# Patient Record
Sex: MALE | Race: OTHER | Employment: FULL TIME | URBAN - METROPOLITAN AREA
[De-identification: names, ages, dates, MRNs, and addresses within clinical notes are randomized per-mention and may not be internally consistent; named-entity substitution may affect disease eponyms.]

---

## 2017-01-29 ENCOUNTER — APPOINTMENT (OUTPATIENT)
Dept: GENERAL RADIOLOGY | Age: 62
End: 2017-01-29
Attending: EMERGENCY MEDICINE
Payer: COMMERCIAL

## 2017-01-29 ENCOUNTER — HOSPITAL ENCOUNTER (OUTPATIENT)
Age: 62
Discharge: HOME | End: 2017-01-29
Attending: EMERGENCY MEDICINE
Payer: COMMERCIAL

## 2017-01-29 VITALS
HEIGHT: 68 IN | WEIGHT: 178 LBS | SYSTOLIC BLOOD PRESSURE: 122 MMHG | DIASTOLIC BLOOD PRESSURE: 78 MMHG | RESPIRATION RATE: 16 BRPM | HEART RATE: 68 BPM | TEMPERATURE: 98 F | OXYGEN SATURATION: 98 % | BODY MASS INDEX: 26.98 KG/M2

## 2017-01-29 DIAGNOSIS — S61.411A: Primary | ICD-10-CM

## 2017-01-29 PROCEDURE — 99213 OFFICE O/P EST LOW 20 MIN: CPT

## 2017-01-29 PROCEDURE — 99203 OFFICE O/P NEW LOW 30 MIN: CPT

## 2017-01-29 PROCEDURE — 73130 X-RAY EXAM OF HAND: CPT

## 2017-01-29 RX ORDER — CEPHALEXIN 500 MG/1
500 CAPSULE ORAL 3 TIMES DAILY
Qty: 21 CAPSULE | Refills: 0 | Status: SHIPPED | OUTPATIENT
Start: 2017-01-29 | End: 2017-02-05

## 2017-01-29 NOTE — ED PROVIDER NOTES
Patient Seen in: [facility] [address] [place] [address]    History   Patient presents with:  Laceration Abrasion (integumentary)    Stated Complaint: laceration r hand    HPI    Patient is a 28-year-old male with a history of hypertension and coron Oral Tab EC,  Take  by mouth.        Family History   Problem Relation Age of Onset   • Diabetes Father    • Diabetes Mother    • Heart Disease Mother    • Lipids Other      family h/o hyperlipidemia and vascular disease   • Glaucoma Neg    • Macular degene treatment, right, initial encounter  (primary encounter diagnosis)    Disposition:  Discharge    Follow-up:  Valerie Bowman MD  [address]  [facility] [phone]    Schedule an appointment as soon as possible for a visit in 2 days  For R

## 2017-02-10 ENCOUNTER — OFFICE VISIT (OUTPATIENT)
Dept: INTERNAL MEDICINE CLINIC | Facility: CLINIC | Age: 62
End: 2017-02-10

## 2017-02-10 ENCOUNTER — TELEPHONE (OUTPATIENT)
Dept: INTERNAL MEDICINE CLINIC | Facility: CLINIC | Age: 62
End: 2017-02-10

## 2017-02-10 VITALS
TEMPERATURE: 98 F | OXYGEN SATURATION: 98 % | SYSTOLIC BLOOD PRESSURE: 130 MMHG | BODY MASS INDEX: 25.5 KG/M2 | HEART RATE: 76 BPM | DIASTOLIC BLOOD PRESSURE: 78 MMHG | HEIGHT: 69 IN | WEIGHT: 172.19 LBS

## 2017-02-10 DIAGNOSIS — Q38.3 TONGUE ABNORMALITY: ICD-10-CM

## 2017-02-10 DIAGNOSIS — I10 ESSENTIAL HYPERTENSION WITH GOAL BLOOD PRESSURE LESS THAN 140/90: ICD-10-CM

## 2017-02-10 DIAGNOSIS — I25.83 CORONARY ARTERY DISEASE DUE TO LIPID RICH PLAQUE: ICD-10-CM

## 2017-02-10 DIAGNOSIS — Z23 IMMUNIZATION DUE: Primary | ICD-10-CM

## 2017-02-10 DIAGNOSIS — E11.9 TYPE 2 DIABETES MELLITUS WITHOUT COMPLICATION, WITHOUT LONG-TERM CURRENT USE OF INSULIN (HCC): ICD-10-CM

## 2017-02-10 DIAGNOSIS — I25.10 CORONARY ARTERY DISEASE DUE TO LIPID RICH PLAQUE: ICD-10-CM

## 2017-02-10 PROCEDURE — 90471 IMMUNIZATION ADMIN: CPT | Performed by: INTERNAL MEDICINE

## 2017-02-10 PROCEDURE — 99212 OFFICE O/P EST SF 10 MIN: CPT | Performed by: INTERNAL MEDICINE

## 2017-02-10 PROCEDURE — 90670 PCV13 VACCINE IM: CPT | Performed by: INTERNAL MEDICINE

## 2017-02-10 PROCEDURE — 99214 OFFICE O/P EST MOD 30 MIN: CPT | Performed by: INTERNAL MEDICINE

## 2017-02-10 RX ORDER — CLOTRIMAZOLE AND BETAMETHASONE DIPROPIONATE 10; .64 MG/G; MG/G
CREAM TOPICAL
Refills: 1 | COMMUNITY
Start: 2017-01-07

## 2017-02-10 RX ORDER — METFORMIN HYDROCHLORIDE 500 MG/1
TABLET, EXTENDED RELEASE ORAL
Qty: 270 TABLET | Refills: 0 | Status: SHIPPED | OUTPATIENT
Start: 2017-02-10 | End: 2017-04-04

## 2017-02-10 NOTE — PROGRESS NOTES
HPI:    Patient ID: Angela Butcher is a 62year old male. HPI  Establish care.    Angela Butcher is a 62year old male who presents for a complete physical exam.   HPI:   Patient presents with:  Establish Care     Hypertension  Patient is here CA 8.8 11/07/2016 09:52 AM   AST 21 11/07/2016 09:52 AM   ALT 20 11/07/2016 09:52 AM   TSH 2.36 09/22/2014 08:44 AM          Lab Results  Component Value Date/Time   CHOLEST 145 11/07/2016 09:52 AM   HDL 36 11/07/2016 09:52 AM   TRIG 144 11/07/2016 09:52 A MetFORMIN HCl  MG Oral Tablet 24 Hr TAKE TWO TABLETS BY MOUTH AT BEDTIME and 1 tab in AM. Disp: 270 tablet Rfl: 0   nystatin [id number] UNIT/ML Mouth/Throat Suspension Take 5 mL (500,000 Units total) by mouth 4 (four) times daily.  Disp: 140 mL Rfl: 0   l • Diabetes Sister    • Diabetes Brother       Social History:  Social History    Marital Status:              Spouse Name:                       Years of Education:                 Number of children:               Occupational History  Occupation Neurological: Negative. Negative for dizziness, tremors, seizures, syncope, facial asymmetry, speech difficulty, weakness, light-headedness, numbness and headaches. Psychiatric/Behavioral: Negative.   Negative for suicidal ideas, hallucinations, behavior • Age-related nuclear cataract of both eyes 4/23/2015   • Type II or unspecified type diabetes mellitus without mention of complication, not stated as uncontrolled Dx 2014     Pills only   • Fungal infection      Toingue. bx.            Past Surgical Histor Nose: Nose normal. No mucosal edema, rhinorrhea, nose lacerations or sinus tenderness. Right sinus exhibits no maxillary sinus tenderness and no frontal sinus tenderness. Left sinus exhibits no maxillary sinus tenderness and no frontal sinus tenderness. Pulmonary/Chest: Effort normal and breath sounds normal. No accessory muscle usage or stridor. No apnea, no tachypnea and no bradypnea. No respiratory distress. He has no decreased breath sounds. He has no wheezes. He has no rhonchi. He has no rales.  He ex Type 2 diabetes mellitus without complication, without long-term current use of insulin (hcc) Better no lows. Careful with diet and excercise at least 30 minutes 3-4 times a week. Check sugars at different times on different dates. Careful with low sugars.

## 2017-02-10 NOTE — TELEPHONE ENCOUNTER
Patient will call office back with eye doctor information signed medical release  [facility]

## 2017-02-10 NOTE — PATIENT INSTRUCTIONS
ASSESSMENT/PLAN:   Immunization due  (primary encounter diagnosis)    Type 2 diabetes mellitus without complication, without long-term current use of insulin (hcc) Better no lows. Careful with diet and excercise at least 30 minutes 3-4 times a week.  Check

## 2017-02-10 NOTE — TELEPHONE ENCOUNTER
Medical record release faxed with confirmation:    Dr Rodriguez [facility]    [phone]  Fax [phone]

## 2017-04-04 ENCOUNTER — TELEPHONE (OUTPATIENT)
Dept: INTERNAL MEDICINE CLINIC | Facility: CLINIC | Age: 62
End: 2017-04-04

## 2017-04-04 RX ORDER — METFORMIN HYDROCHLORIDE 500 MG/1
TABLET, EXTENDED RELEASE ORAL
Qty: 90 TABLET | Refills: 0 | Status: SHIPPED | OUTPATIENT
Start: 2017-04-04 | End: 2022-01-17

## 2017-04-04 NOTE — TELEPHONE ENCOUNTER
Current outpatient prescriptions:   •  •  MetFORMIN HCl  MG Oral Tablet 24 Hr, TAKE TWO TABLETS BY MOUTH AT BEDTIME and 1 tab in AM., Disp: 270 tablet, Rfl: 0  •

## 2017-04-15 ENCOUNTER — TELEPHONE (OUTPATIENT)
Dept: INTERNAL MEDICINE CLINIC | Facility: CLINIC | Age: 62
End: 2017-04-15

## 2017-04-15 NOTE — TELEPHONE ENCOUNTER
Patients daughter is calling with c/o patient having a fever that started two days ago. Per daughter patient has a fever of 100.0, has nausea, and is weak.   Per daughter patient is lucid, is replacing fluids, has no abd pains, no body aches, no headache,

## 2017-05-15 ENCOUNTER — TELEPHONE (OUTPATIENT)
Dept: INTERNAL MEDICINE CLINIC | Facility: CLINIC | Age: 62
End: 2017-05-15

## 2017-05-15 NOTE — TELEPHONE ENCOUNTER
[facility]  Ext. [phone] Patient is due for diabetic follow up appointment. Please assist in scheduling appointment when patient returns call.

## 2017-05-23 ENCOUNTER — TELEPHONE (OUTPATIENT)
Dept: INTERNAL MEDICINE CLINIC | Facility: CLINIC | Age: 62
End: 2017-05-23

## 2017-05-23 DIAGNOSIS — E11.9 DIABETES MELLITUS TYPE 2 WITHOUT RETINOPATHY (HCC): Primary | ICD-10-CM

## 2017-07-24 ENCOUNTER — CHARTING TRANS (OUTPATIENT)
Dept: OTHER | Age: 62
End: 2017-07-24

## 2017-07-24 ASSESSMENT — PAIN SCALES - GENERAL: PAINLEVEL_OUTOF10: 0

## 2017-07-27 ENCOUNTER — LAB SERVICES (OUTPATIENT)
Dept: OTHER | Age: 62
End: 2017-07-27

## 2017-07-28 ENCOUNTER — PRIOR ORIGINAL RECORDS (OUTPATIENT)
Dept: OTHER | Age: 62
End: 2017-07-28

## 2017-07-28 LAB
ALBUMIN SERPL-MCNC: 4.3 G/DL (ref 3.6–5.1)
ALBUMIN/GLOB SERPL: 1.5 (ref 1–2.4)
ALP SERPL-CCNC: 51 UNITS/L (ref 45–117)
ALT SERPL-CCNC: 32 UNITS/L
ANION GAP SERPL CALC-SCNC: 12 MMOL/L (ref 10–20)
AST SERPL-CCNC: 25 UNITS/L
BASOPHILS # BLD: 0 K/MCL (ref 0–0.3)
BASOPHILS NFR BLD: 0 %
BILIRUB SERPL-MCNC: 1 MG/DL (ref 0.2–1)
BUN SERPL-MCNC: 10 MG/DL (ref 6–20)
BUN/CREAT SERPL: 14 (ref 7–25)
CALCIUM SERPL-MCNC: 8.4 MG/DL (ref 8.4–10.2)
CHLORIDE SERPL-SCNC: 103 MMOL/L (ref 98–107)
CHOLEST SERPL-MCNC: 135 MG/DL
CHOLEST/HDLC SERPL: 3.5
CO2 SERPL-SCNC: 30 MMOL/L (ref 21–32)
CREAT SERPL-MCNC: 0.73 MG/DL (ref 0.67–1.17)
DIFFERENTIAL METHOD BLD: NORMAL
EOSINOPHIL # BLD: 0.1 K/MCL (ref 0.1–0.5)
EOSINOPHIL NFR BLD: 2 %
ERYTHROCYTE [DISTWIDTH] IN BLOOD: 12.4 % (ref 11–15)
GLOBULIN SER-MCNC: 2.9 G/DL (ref 2–4)
GLUCOSE SERPL-MCNC: 110 MG/DL (ref 65–99)
HBA1C MFR BLD: 6.5 % (ref 4.5–5.6)
HDLC SERPL-MCNC: 39 MG/DL
HEMATOCRIT: 41.8 % (ref 39–51)
HEMOGLOBIN: 13.7 G/DL (ref 13–17)
LDLC SERPL CALC-MCNC: 62 MG/DL
LENGTH OF FAST TIME PATIENT: 12 HRS
LENGTH OF FAST TIME PATIENT: 12 HRS
LYMPHOCYTES # BLD: 2 K/MCL (ref 1–4)
LYMPHOCYTES NFR BLD: 36 %
MEAN CORPUSCULAR HEMOGLOBIN: 29.8 PG (ref 26–34)
MEAN CORPUSCULAR HGB CONC: 32.8 G/DL (ref 32–36.5)
MEAN CORPUSCULAR VOLUME: 91.1 FL (ref 78–100)
MONOCYTES # BLD: 0.5 K/MCL (ref 0.3–0.9)
MONOCYTES NFR BLD: 9 %
NEUTROPHILS # BLD: 2.9 K/MCL (ref 1.8–7.7)
NEUTROPHILS NFR BLD: 53 %
NONHDLC SERPL-MCNC: 96 MG/DL
PLATELET COUNT: 221 K/MCL (ref 140–450)
POTASSIUM SERPL-SCNC: 4.2 MMOL/L (ref 3.4–5.1)
RED CELL COUNT: 4.59 MIL/MCL (ref 4.5–5.9)
SODIUM SERPL-SCNC: 141 MMOL/L (ref 135–145)
TOTAL PROTEIN: 7.2 G/DL (ref 6.4–8.2)
TRIGL SERPL-MCNC: 170 MG/DL
WHITE BLOOD COUNT: 5.6 K/MCL (ref 4.2–11)

## 2017-09-06 ENCOUNTER — PRIOR ORIGINAL RECORDS (OUTPATIENT)
Dept: OTHER | Age: 62
End: 2017-09-06

## 2017-09-06 LAB
ALBUMIN: 4.3 G/DL
BILIRUBIN TOTAL: 1 MG/DL
BUN: 10 MG/DL
CALCIUM: 8.4 MG/DL
CHLORIDE: 103 MEQ/L
CHOLESTEROL, TOTAL: 135 MG/DL
CREATININE, SERUM: 0.73 MG/DL
GLOBULIN: 2.9 G/DL
GLUCOSE: 110 MG/DL
HDL CHOLESTEROL: 39 MG/DL
HEMATOCRIT: 41.8 %
HEMOGLOBIN A1C: 6.5 %
HEMOGLOBIN: 13.7 G/DL
LDL CHOLESTEROL: 62 MG/DL
PLATELETS: 221 K/UL
POTASSIUM, SERUM: 4.2 MEQ/L
PROTEIN, TOTAL: 7.2 G/DL
RED BLOOD COUNT: 4.59 X 10-6/U
SGOT (AST): 25 IU/L
SGPT (ALT): 32 IU/L
SODIUM: 141 MEQ/L
TRIGLYCERIDES: 170 MG/DL
WHITE BLOOD COUNT: 5.6 X 10-3/U

## 2017-09-11 LAB
ALBUMIN: 4.3 G/DL
ALKALINE PHOSPHATATE(ALK PHOS): 51 IU/L
ALT (SGPT): 32 U/L
AST (SGOT): 25 U/L
BILIRUBIN TOTAL: 1 MG/DL
BUN: 10 MG/DL
CALCIUM: 8.4 MG/DL
CHLORIDE: 103 MEQ/L
CHOLESTEROL, TOTAL: 135 MG/DL
CREATININE, SERUM: 0.73 MG/DL
GLOBULIN: 2.9 G/DL
GLUCOSE: 110 MG/DL
GLUCOSE: 110 MG/DL
HDL CHOLESTEROL: 39 MG/DL
HEMOGLOBIN A1C: 6.5 %
LDL CHOLESTEROL: 62 MG/DL
POTASSIUM, SERUM: 4.2 MEQ/L
PROTEIN, TOTAL: 7.2 G/DL
SGOT (AST): 25 IU/L
SGPT (ALT): 32 IU/L
SODIUM: 141 MEQ/L
TRIGLYCERIDES: 170 MG/DL

## 2017-09-13 ENCOUNTER — PRIOR ORIGINAL RECORDS (OUTPATIENT)
Dept: OTHER | Age: 62
End: 2017-09-13

## 2017-10-10 ENCOUNTER — CHARTING TRANS (OUTPATIENT)
Dept: OTHER | Age: 62
End: 2017-10-10

## 2017-10-10 ASSESSMENT — PAIN SCALES - GENERAL: PAINLEVEL_OUTOF10: 3

## 2017-11-13 ENCOUNTER — LAB SERVICES (OUTPATIENT)
Dept: OTHER | Age: 62
End: 2017-11-13

## 2017-11-13 ENCOUNTER — CHARTING TRANS (OUTPATIENT)
Dept: OTHER | Age: 62
End: 2017-11-13

## 2017-11-13 LAB
CREATININE RANDOM URINE: 46 MG/DL
MICROALBUMIN UR-MCNC: 0.62 MG/DL
MICROALBUMIN/CREAT UR: 13.5 MCG/MG

## 2017-11-14 ENCOUNTER — CHARTING TRANS (OUTPATIENT)
Dept: OTHER | Age: 62
End: 2017-11-14

## 2017-11-14 LAB
ALBUMIN SERPL-MCNC: 4 G/DL (ref 3.6–5.1)
ALBUMIN/GLOB SERPL: 1.1 (ref 1–2.4)
ALP SERPL-CCNC: 58 UNITS/L (ref 45–117)
ALT SERPL-CCNC: 32 UNITS/L
ANION GAP SERPL CALC-SCNC: 10 MMOL/L (ref 10–20)
AST SERPL-CCNC: 17 UNITS/L
BILIRUB SERPL-MCNC: 0.8 MG/DL (ref 0.2–1)
BUN SERPL-MCNC: 8 MG/DL (ref 6–20)
BUN/CREAT SERPL: 11 (ref 7–25)
CALCIUM SERPL-MCNC: 8.4 MG/DL (ref 8.4–10.2)
CHLORIDE SERPL-SCNC: 104 MMOL/L (ref 98–107)
CHOLEST SERPL-MCNC: 158 MG/DL
CHOLEST/HDLC SERPL: 3.8
CO2 SERPL-SCNC: 29 MMOL/L (ref 21–32)
CREAT SERPL-MCNC: 0.76 MG/DL (ref 0.67–1.17)
GLOBULIN SER-MCNC: 3.5 G/DL (ref 2–4)
GLUCOSE SERPL-MCNC: 117 MG/DL (ref 65–99)
HBA1C MFR BLD: 6.7 % (ref 4.5–5.6)
HDLC SERPL-MCNC: 42 MG/DL
LDLC SERPL CALC-MCNC: 63 MG/DL
LENGTH OF FAST TIME PATIENT: 12 HRS
LENGTH OF FAST TIME PATIENT: 12 HRS
NONHDLC SERPL-MCNC: 116 MG/DL
POTASSIUM SERPL-SCNC: 4.2 MMOL/L (ref 3.4–5.1)
SODIUM SERPL-SCNC: 139 MMOL/L (ref 135–145)
TOTAL PROTEIN: 7.5 G/DL (ref 6.4–8.2)
TRIGL SERPL-MCNC: 266 MG/DL

## 2018-05-12 ENCOUNTER — CHARTING TRANS (OUTPATIENT)
Dept: OTHER | Age: 63
End: 2018-05-12

## 2018-09-11 ENCOUNTER — PRIOR ORIGINAL RECORDS (OUTPATIENT)
Dept: OTHER | Age: 63
End: 2018-09-11

## 2018-09-12 ENCOUNTER — PRIOR ORIGINAL RECORDS (OUTPATIENT)
Dept: OTHER | Age: 63
End: 2018-09-12

## 2018-09-12 ENCOUNTER — MYAURORA ACCOUNT LINK (OUTPATIENT)
Dept: OTHER | Age: 63
End: 2018-09-12

## 2018-09-12 LAB
ALBUMIN: 4.4 G/DL
ALKALINE PHOSPHATATE(ALK PHOS): 52 IU/L
BILIRUBIN TOTAL: 1.1 MG/DL
BUN: 8 MG/DL
CALCIUM: 9 MG/DL
CHLORIDE: 100 MEQ/L
CHOLESTEROL, TOTAL: 167 MG/DL
CREATININE, SERUM: 0.78 MG/DL
GLOBULIN: 2.6 G/DL
GLUCOSE: 126 MG/DL
HDL CHOLESTEROL: 43 MG/DL
LDL CHOLESTEROL: 91 MG/DL
POTASSIUM, SERUM: 4.1 MEQ/L
PROTEIN, TOTAL: 7 G/DL
SGOT (AST): 16 IU/L
SGPT (ALT): 14 IU/L
SODIUM: 137 MEQ/L
TRIGLYCERIDES: 238 MG/DL

## 2018-09-13 ENCOUNTER — PRIOR ORIGINAL RECORDS (OUTPATIENT)
Dept: OTHER | Age: 63
End: 2018-09-13

## 2018-09-14 ENCOUNTER — PRIOR ORIGINAL RECORDS (OUTPATIENT)
Dept: OTHER | Age: 63
End: 2018-09-14

## 2018-10-09 ENCOUNTER — CHARTING TRANS (OUTPATIENT)
Dept: OTHER | Age: 63
End: 2018-10-09

## 2018-10-09 ASSESSMENT — PAIN SCALES - GENERAL: PAINLEVEL_OUTOF10: 0

## 2018-11-01 ENCOUNTER — CHARTING TRANS (OUTPATIENT)
Dept: OTHER | Age: 63
End: 2018-11-01

## 2018-11-02 VITALS
WEIGHT: 171.96 LBS | RESPIRATION RATE: 16 BRPM | HEART RATE: 75 BPM | OXYGEN SATURATION: 98 % | BODY MASS INDEX: 25.47 KG/M2 | TEMPERATURE: 98.1 F | HEIGHT: 69 IN

## 2018-11-03 VITALS
HEART RATE: 71 BPM | WEIGHT: 172.18 LBS | TEMPERATURE: 98.5 F | HEIGHT: 69 IN | OXYGEN SATURATION: 99 % | BODY MASS INDEX: 25.5 KG/M2 | RESPIRATION RATE: 17 BRPM

## 2018-11-27 VITALS
WEIGHT: 165.67 LBS | OXYGEN SATURATION: 99 % | RESPIRATION RATE: 16 BRPM | HEART RATE: 68 BPM | HEIGHT: 69 IN | BODY MASS INDEX: 24.54 KG/M2

## 2018-12-03 ENCOUNTER — PRIOR ORIGINAL RECORDS (OUTPATIENT)
Dept: OTHER | Age: 63
End: 2018-12-03

## 2018-12-04 LAB
CHOLESTEROL, TOTAL: 142 MG/DL
HDL CHOLESTEROL: 47 MG/DL
LDL CHOLESTEROL: 71 MG/DL
TRIGLYCERIDES: 164 MG/DL

## 2018-12-10 ENCOUNTER — PRIOR ORIGINAL RECORDS (OUTPATIENT)
Dept: OTHER | Age: 63
End: 2018-12-10

## 2018-12-11 ENCOUNTER — PRIOR ORIGINAL RECORDS (OUTPATIENT)
Dept: OTHER | Age: 63
End: 2018-12-11

## 2019-01-24 ENCOUNTER — HOSPITAL (OUTPATIENT)
Dept: OTHER | Age: 64
End: 2019-01-24
Attending: FAMILY MEDICINE

## 2019-01-24 ENCOUNTER — DIAGNOSTIC TRANS (OUTPATIENT)
Dept: OTHER | Age: 64
End: 2019-01-24

## 2019-01-24 LAB
ANALYZER ANC (IANC): ABNORMAL
ANALYZER ANC (IANC): ABNORMAL
ANION GAP SERPL CALC-SCNC: 19 MMOL/L (ref 10–20)
ANION GAP SERPL CALC-SCNC: 19 MMOL/L (ref 10–20)
BASO+EOS+MONOS # BLD: 0.4 K/MCL (ref 0.1–1.1)
BASO+EOS+MONOS # BLD: 0.4 THOUSAND/MCL (ref 0.1–1.1)
BASO+EOS+MONOS NFR BLD: 5 %
BASO+EOS+MONOS NFR BLD: 5 %
BUN SERPL-MCNC: 9 MG/DL (ref 6–20)
BUN SERPL-MCNC: 9 MG/DL (ref 6–20)
BUN/CREAT SERPL: 13 (ref 7–25)
BUN/CREAT SERPL: 13 (ref 7–25)
CHLORIDE SERPL-SCNC: 98 MMOL/L (ref 98–107)
CHLORIDE: 98 MMOL/L (ref 98–107)
CO2 SERPL-SCNC: 25 MMOL/L (ref 21–32)
CO2 SERPL-SCNC: 25 MMOL/L (ref 21–32)
CREAT SERPL-MCNC: 0.7 MG/DL (ref 0.67–1.17)
CREAT SERPL-MCNC: 0.7 MG/DL (ref 0.67–1.17)
DIFFERENTIAL METHOD BLD: ABNORMAL
DIFFERENTIAL METHOD BLD: ABNORMAL
ERYTHROCYTE [DISTWIDTH] IN BLOOD: 12.9 % (ref 11–15)
ERYTHROCYTE [DISTWIDTH] IN BLOOD: 12.9 % (ref 11–15)
GLUCOSE SERPL-MCNC: 237 MG/DL (ref 65–99)
GLUCOSE SERPL-MCNC: 237 MG/DL (ref 65–99)
HCT VFR BLD CALC: 41.8 % (ref 39–51)
HEMATOCRIT: 41.8 % (ref 39–51)
HGB BLD-MCNC: 14 G/DL (ref 13–17)
HGB BLD-MCNC: 14 GM/DL (ref 13–17)
LYMPHOCYTES # BLD: 1.3 K/MCL (ref 1–4)
LYMPHOCYTES # BLD: 1.3 THOUSAND/MCL (ref 1–4)
LYMPHOCYTES NFR BLD: 13 %
LYMPHOCYTES NFR BLD: 13 %
MCH RBC QN AUTO: 30.1 PG (ref 26–34)
MCH RBC QN AUTO: 30.1 PG (ref 26–34)
MCHC RBC AUTO-ENTMCNC: 33.5 G/DL (ref 32–36.5)
MCHC RBC AUTO-ENTMCNC: 33.5 GM/DL (ref 32–36.5)
MCV RBC AUTO: 89.9 FL (ref 78–100)
MCV RBC AUTO: 89.9 FL (ref 78–100)
NEUTROPHILS # BLD: 7.9 K/MCL (ref 1.8–7.7)
NEUTROPHILS # BLD: 7.9 THOUSAND/MCL (ref 1.8–7.7)
NEUTROPHILS NFR BLD: 82 %
NEUTROPHILS NFR BLD: 82 %
NEUTS SEG NFR BLD: ABNORMAL %
NEUTS SEG NFR BLD: ABNORMAL %
NRBC (NRBCRE): ABNORMAL
NRBC (NRBCRE): ABNORMAL
PLATELET # BLD: 229 K/MCL (ref 140–450)
PLATELET # BLD: 229 THOUSAND/MCL (ref 140–450)
POTASSIUM SERPL-SCNC: 3.8 MMOL/L (ref 3.4–5.1)
POTASSIUM SERPL-SCNC: 3.8 MMOL/L (ref 3.4–5.1)
RBC # BLD: 4.65 MIL/MCL (ref 4.5–5.9)
RBC # BLD: 4.65 MILLION/MCL (ref 4.5–5.9)
SODIUM SERPL-SCNC: 138 MMOL/L (ref 135–145)
SODIUM SERPL-SCNC: 138 MMOL/L (ref 135–145)
WBC # BLD: 9.6 K/MCL (ref 4.2–11)
WBC # BLD: 9.6 THOUSAND/MCL (ref 4.2–11)

## 2019-02-28 VITALS
HEIGHT: 68 IN | SYSTOLIC BLOOD PRESSURE: 126 MMHG | HEART RATE: 74 BPM | RESPIRATION RATE: 14 BRPM | DIASTOLIC BLOOD PRESSURE: 70 MMHG | WEIGHT: 168 LBS | BODY MASS INDEX: 25.46 KG/M2

## 2019-02-28 VITALS
BODY MASS INDEX: 25.46 KG/M2 | SYSTOLIC BLOOD PRESSURE: 118 MMHG | HEIGHT: 68 IN | DIASTOLIC BLOOD PRESSURE: 70 MMHG | HEART RATE: 64 BPM | WEIGHT: 168 LBS

## 2019-05-02 RX ORDER — SIMVASTATIN 20 MG
TABLET ORAL
Qty: 90 TABLET | Refills: 0 | Status: SHIPPED | OUTPATIENT
Start: 2019-05-02 | End: 2019-08-05 | Stop reason: SDUPTHER

## 2019-05-03 RX ORDER — CLOTRIMAZOLE AND BETAMETHASONE DIPROPIONATE 10; .64 MG/G; MG/G
CREAM TOPICAL
Qty: 30 G | Refills: 1 | Status: SHIPPED | OUTPATIENT
Start: 2019-05-03 | End: 2019-11-14 | Stop reason: SDUPTHER

## 2019-06-11 ENCOUNTER — OFFICE VISIT (OUTPATIENT)
Dept: INTERNAL MEDICINE | Age: 64
End: 2019-06-11

## 2019-06-11 VITALS
RESPIRATION RATE: 16 BRPM | HEART RATE: 80 BPM | TEMPERATURE: 98 F | HEIGHT: 68 IN | OXYGEN SATURATION: 99 % | SYSTOLIC BLOOD PRESSURE: 125 MMHG | WEIGHT: 165.46 LBS | BODY MASS INDEX: 25.08 KG/M2 | DIASTOLIC BLOOD PRESSURE: 77 MMHG

## 2019-06-11 DIAGNOSIS — I25.10 CORONARY ARTERY DISEASE INVOLVING NATIVE CORONARY ARTERY OF NATIVE HEART WITHOUT ANGINA PECTORIS: ICD-10-CM

## 2019-06-11 DIAGNOSIS — E11.9 CONTROLLED TYPE 2 DIABETES MELLITUS WITHOUT COMPLICATION, WITHOUT LONG-TERM CURRENT USE OF INSULIN (CMD): ICD-10-CM

## 2019-06-11 DIAGNOSIS — E78.2 MIXED HYPERLIPIDEMIA: ICD-10-CM

## 2019-06-11 DIAGNOSIS — J20.9 ACUTE BRONCHITIS, UNSPECIFIED ORGANISM: Primary | ICD-10-CM

## 2019-06-11 DIAGNOSIS — I10 BENIGN ESSENTIAL HYPERTENSION: ICD-10-CM

## 2019-06-11 DIAGNOSIS — Z12.5 PROSTATE CANCER SCREENING: ICD-10-CM

## 2019-06-11 DIAGNOSIS — L30.9 ECZEMA, UNSPECIFIED TYPE: ICD-10-CM

## 2019-06-11 PROBLEM — Z95.5 HISTORY OF HEART ARTERY STENT: Status: ACTIVE | Noted: 2017-09-06

## 2019-06-11 PROCEDURE — 99214 OFFICE O/P EST MOD 30 MIN: CPT | Performed by: INTERNAL MEDICINE

## 2019-06-11 RX ORDER — METOPROLOL SUCCINATE 25 MG/1
25 TABLET, EXTENDED RELEASE ORAL DAILY
COMMUNITY
End: 2019-11-13 | Stop reason: SDUPTHER

## 2019-06-11 RX ORDER — FLUTICASONE PROPIONATE 50 MCG
2 SPRAY, SUSPENSION (ML) NASAL DAILY
Qty: 16 G | Refills: 0 | Status: SHIPPED | OUTPATIENT
Start: 2019-06-11 | End: 2020-05-13

## 2019-06-11 RX ORDER — AZITHROMYCIN 250 MG/1
TABLET, FILM COATED ORAL
Qty: 6 TABLET | Refills: 0 | Status: SHIPPED | OUTPATIENT
Start: 2019-06-11 | End: 2019-11-14 | Stop reason: ALTCHOICE

## 2019-06-11 RX ORDER — LISINOPRIL 5 MG/1
5 TABLET ORAL DAILY
COMMUNITY
End: 2019-06-14 | Stop reason: SDUPTHER

## 2019-06-11 RX ORDER — PANTOPRAZOLE SODIUM 40 MG/1
40 TABLET, DELAYED RELEASE ORAL DAILY
COMMUNITY
End: 2019-06-14 | Stop reason: SDUPTHER

## 2019-06-11 ASSESSMENT — ENCOUNTER SYMPTOMS
HEADACHES: 0
CHEST TIGHTNESS: 0
DIARRHEA: 0
TROUBLE SWALLOWING: 0
FEVER: 0
ABDOMINAL PAIN: 0
UNEXPECTED WEIGHT CHANGE: 0
SINUS PRESSURE: 0
WHEEZING: 0
COUGH: 1
LIGHT-HEADEDNESS: 0
ABDOMINAL DISTENTION: 0
SHORTNESS OF BREATH: 0
CONSTIPATION: 0
NERVOUS/ANXIOUS: 0
BACK PAIN: 0
EYE REDNESS: 0
NAUSEA: 0
EYE PAIN: 0
BLOOD IN STOOL: 0
APPETITE CHANGE: 0
COLOR CHANGE: 0
DIZZINESS: 0
EYE DISCHARGE: 0
WEAKNESS: 0
NUMBNESS: 0
SORE THROAT: 1
FATIGUE: 0

## 2019-06-11 ASSESSMENT — PATIENT HEALTH QUESTIONNAIRE - PHQ9
2. FEELING DOWN, DEPRESSED OR HOPELESS: NOT AT ALL
SUM OF ALL RESPONSES TO PHQ9 QUESTIONS 1 AND 2: 0
SUM OF ALL RESPONSES TO PHQ9 QUESTIONS 1 AND 2: 0
1. LITTLE INTEREST OR PLEASURE IN DOING THINGS: NOT AT ALL

## 2019-06-14 RX ORDER — PANTOPRAZOLE SODIUM 40 MG/1
40 TABLET, DELAYED RELEASE ORAL DAILY
Qty: 90 TABLET | Refills: 0 | Status: SHIPPED | OUTPATIENT
Start: 2019-06-14 | End: 2019-10-01 | Stop reason: SDUPTHER

## 2019-06-14 RX ORDER — LISINOPRIL 5 MG/1
5 TABLET ORAL DAILY
Qty: 90 TABLET | Refills: 0 | Status: SHIPPED | OUTPATIENT
Start: 2019-06-14 | End: 2019-09-14 | Stop reason: SDUPTHER

## 2019-06-28 ENCOUNTER — LAB SERVICES (OUTPATIENT)
Dept: LAB | Age: 64
End: 2019-06-28

## 2019-06-28 DIAGNOSIS — I25.10 CORONARY ARTERY DISEASE INVOLVING NATIVE CORONARY ARTERY OF NATIVE HEART WITHOUT ANGINA PECTORIS: ICD-10-CM

## 2019-06-28 DIAGNOSIS — E11.9 CONTROLLED TYPE 2 DIABETES MELLITUS WITHOUT COMPLICATION, WITHOUT LONG-TERM CURRENT USE OF INSULIN (CMD): ICD-10-CM

## 2019-06-28 DIAGNOSIS — I10 BENIGN ESSENTIAL HYPERTENSION: ICD-10-CM

## 2019-06-28 DIAGNOSIS — E78.2 MIXED HYPERLIPIDEMIA: ICD-10-CM

## 2019-06-28 DIAGNOSIS — Z12.5 PROSTATE CANCER SCREENING: ICD-10-CM

## 2019-06-28 DIAGNOSIS — L30.9 ECZEMA, UNSPECIFIED TYPE: ICD-10-CM

## 2019-06-28 LAB
ALBUMIN SERPL-MCNC: 4.1 G/DL (ref 3.6–5.1)
ALBUMIN/GLOB SERPL: 1.4 {RATIO} (ref 1–2.4)
ALP SERPL-CCNC: 51 UNITS/L (ref 45–117)
ALT SERPL-CCNC: 26 UNITS/L
ANION GAP SERPL CALC-SCNC: 7 MMOL/L (ref 10–20)
AST SERPL-CCNC: 18 UNITS/L
BILIRUB SERPL-MCNC: 1.2 MG/DL (ref 0.2–1)
BUN SERPL-MCNC: 10 MG/DL (ref 6–20)
BUN/CREAT SERPL: 13 (ref 7–25)
CALCIUM SERPL-MCNC: 8.3 MG/DL (ref 8.4–10.2)
CHLORIDE SERPL-SCNC: 105 MMOL/L (ref 98–107)
CHOLEST SERPL-MCNC: 131 MG/DL
CHOLEST/HDLC SERPL: 2.7 {RATIO}
CO2 SERPL-SCNC: 29 MMOL/L (ref 21–32)
CREAT SERPL-MCNC: 0.74 MG/DL (ref 0.67–1.17)
FASTING STATUS PATIENT QL REPORTED: 13 HRS
GLOBULIN SER-MCNC: 3 G/DL (ref 2–4)
GLUCOSE SERPL-MCNC: 108 MG/DL (ref 65–99)
HDLC SERPL-MCNC: 48 MG/DL
LDLC SERPL-MCNC: 55 MG/DL
LENGTH OF FAST TIME PATIENT: 13 HRS
NONHDLC SERPL-MCNC: 83 MG/DL
POTASSIUM SERPL-SCNC: 4 MMOL/L (ref 3.4–5.1)
PROT SERPL-MCNC: 7.1 G/DL (ref 6.4–8.2)
PSA SERPL-MCNC: 1.18 NG/ML
SODIUM SERPL-SCNC: 137 MMOL/L (ref 135–145)
TRIGL SERPL-MCNC: 140 MG/DL
TSH SERPL-ACNC: 2.26 MCUNITS/ML (ref 0.35–5)

## 2019-06-28 PROCEDURE — 36415 COLL VENOUS BLD VENIPUNCTURE: CPT | Performed by: INTERNAL MEDICINE

## 2019-06-28 PROCEDURE — 84443 ASSAY THYROID STIM HORMONE: CPT | Performed by: INTERNAL MEDICINE

## 2019-06-28 PROCEDURE — 80053 COMPREHEN METABOLIC PANEL: CPT | Performed by: INTERNAL MEDICINE

## 2019-06-28 PROCEDURE — G0103 PSA SCREENING: HCPCS | Performed by: INTERNAL MEDICINE

## 2019-06-28 PROCEDURE — 80061 LIPID PANEL: CPT | Performed by: INTERNAL MEDICINE

## 2019-07-01 ENCOUNTER — TELEPHONE (OUTPATIENT)
Dept: INTERNAL MEDICINE | Age: 64
End: 2019-07-01

## 2019-07-01 DIAGNOSIS — E83.51 HYPOCALCEMIA: ICD-10-CM

## 2019-07-01 DIAGNOSIS — R73.9 HYPERGLYCEMIA: Primary | ICD-10-CM

## 2019-07-01 DIAGNOSIS — E11.9 CONTROLLED TYPE 2 DIABETES MELLITUS WITHOUT COMPLICATION, WITHOUT LONG-TERM CURRENT USE OF INSULIN (CMD): ICD-10-CM

## 2019-07-08 ENCOUNTER — LAB SERVICES (OUTPATIENT)
Dept: LAB | Age: 64
End: 2019-07-08

## 2019-07-08 DIAGNOSIS — E11.9 CONTROLLED TYPE 2 DIABETES MELLITUS WITHOUT COMPLICATION, WITHOUT LONG-TERM CURRENT USE OF INSULIN (CMD): ICD-10-CM

## 2019-07-08 DIAGNOSIS — E83.51 HYPOCALCEMIA: ICD-10-CM

## 2019-07-08 LAB
CALCIUM SERPL-MCNC: 9.1 MG/DL (ref 8.4–10.2)
HBA1C MFR BLD: 6.5 % (ref 4.5–5.6)

## 2019-07-08 PROCEDURE — 83036 HEMOGLOBIN GLYCOSYLATED A1C: CPT | Performed by: INTERNAL MEDICINE

## 2019-07-08 PROCEDURE — 82310 ASSAY OF CALCIUM: CPT | Performed by: INTERNAL MEDICINE

## 2019-07-08 PROCEDURE — 36415 COLL VENOUS BLD VENIPUNCTURE: CPT | Performed by: INTERNAL MEDICINE

## 2019-08-05 ENCOUNTER — TELEPHONE (OUTPATIENT)
Dept: SCHEDULING | Age: 64
End: 2019-08-05

## 2019-08-05 RX ORDER — METFORMIN HYDROCHLORIDE 500 MG/1
TABLET, EXTENDED RELEASE ORAL
Qty: 270 TABLET | Refills: 0 | Status: SHIPPED | OUTPATIENT
Start: 2019-08-05 | End: 2019-10-29 | Stop reason: SDUPTHER

## 2019-08-05 RX ORDER — SIMVASTATIN 20 MG
20 TABLET ORAL DAILY
Qty: 90 TABLET | Refills: 0 | Status: SHIPPED | OUTPATIENT
Start: 2019-08-05 | End: 2020-02-03 | Stop reason: SDUPTHER

## 2019-08-06 ENCOUNTER — TELEPHONE (OUTPATIENT)
Dept: SCHEDULING | Age: 64
End: 2019-08-06

## 2019-08-06 RX ORDER — SIMVASTATIN 20 MG
TABLET ORAL
Qty: 90 TABLET | Refills: 0 | Status: SHIPPED | OUTPATIENT
Start: 2019-08-06 | End: 2019-11-14 | Stop reason: ALTCHOICE

## 2019-09-14 RX ORDER — LISINOPRIL 5 MG/1
TABLET ORAL
Qty: 90 TABLET | Refills: 0 | Status: SHIPPED | OUTPATIENT
Start: 2019-09-14 | End: 2019-12-11 | Stop reason: SDUPTHER

## 2019-10-01 RX ORDER — PANTOPRAZOLE SODIUM 40 MG/1
TABLET, DELAYED RELEASE ORAL
Qty: 90 TABLET | Refills: 0 | Status: SHIPPED | OUTPATIENT
Start: 2019-10-01 | End: 2020-01-31 | Stop reason: SDUPTHER

## 2019-10-29 RX ORDER — METFORMIN HYDROCHLORIDE 500 MG/1
TABLET, EXTENDED RELEASE ORAL
Qty: 270 TABLET | Refills: 0 | Status: SHIPPED | OUTPATIENT
Start: 2019-10-29 | End: 2020-02-03 | Stop reason: SDUPTHER

## 2019-11-13 ENCOUNTER — TELEPHONE (OUTPATIENT)
Dept: SCHEDULING | Age: 64
End: 2019-11-13

## 2019-11-13 RX ORDER — METOPROLOL SUCCINATE 25 MG/1
25 TABLET, EXTENDED RELEASE ORAL DAILY
Qty: 90 TABLET | Refills: 0 | Status: SHIPPED | OUTPATIENT
Start: 2019-11-13 | End: 2019-11-14 | Stop reason: SDUPTHER

## 2019-11-14 ENCOUNTER — OFFICE VISIT (OUTPATIENT)
Dept: INTERNAL MEDICINE | Age: 64
End: 2019-11-14

## 2019-11-14 DIAGNOSIS — I10 BENIGN ESSENTIAL HYPERTENSION: Primary | ICD-10-CM

## 2019-11-14 DIAGNOSIS — I25.10 CORONARY ARTERY DISEASE INVOLVING NATIVE CORONARY ARTERY OF NATIVE HEART WITHOUT ANGINA PECTORIS: ICD-10-CM

## 2019-11-14 DIAGNOSIS — Z23 NEED FOR VACCINATION: ICD-10-CM

## 2019-11-14 DIAGNOSIS — B36.9 FUNGAL DERMATITIS: ICD-10-CM

## 2019-11-14 DIAGNOSIS — Z11.59 NEED FOR HEPATITIS C SCREENING TEST: ICD-10-CM

## 2019-11-14 DIAGNOSIS — E11.9 CONTROLLED TYPE 2 DIABETES MELLITUS WITHOUT COMPLICATION, WITHOUT LONG-TERM CURRENT USE OF INSULIN (CMD): ICD-10-CM

## 2019-11-14 DIAGNOSIS — E78.2 MIXED HYPERLIPIDEMIA: ICD-10-CM

## 2019-11-14 PROCEDURE — 90686 IIV4 VACC NO PRSV 0.5 ML IM: CPT

## 2019-11-14 PROCEDURE — 99214 OFFICE O/P EST MOD 30 MIN: CPT | Performed by: INTERNAL MEDICINE

## 2019-11-14 PROCEDURE — 90471 IMMUNIZATION ADMIN: CPT

## 2019-11-14 PROCEDURE — 3078F DIAST BP <80 MM HG: CPT | Performed by: INTERNAL MEDICINE

## 2019-11-14 PROCEDURE — 3074F SYST BP LT 130 MM HG: CPT | Performed by: INTERNAL MEDICINE

## 2019-11-14 RX ORDER — METOPROLOL SUCCINATE 25 MG/1
25 TABLET, EXTENDED RELEASE ORAL DAILY
Qty: 90 TABLET | Refills: 0 | Status: SHIPPED | OUTPATIENT
Start: 2019-11-14 | End: 2020-02-03 | Stop reason: SDUPTHER

## 2019-11-14 RX ORDER — CLOTRIMAZOLE AND BETAMETHASONE DIPROPIONATE 10; .64 MG/G; MG/G
CREAM TOPICAL
Qty: 30 G | Refills: 1 | Status: SHIPPED | OUTPATIENT
Start: 2019-11-14 | End: 2020-10-29

## 2019-11-14 ASSESSMENT — ENCOUNTER SYMPTOMS
NAUSEA: 0
WEAKNESS: 0
WHEEZING: 0
SHORTNESS OF BREATH: 0
CHEST TIGHTNESS: 0
CONSTIPATION: 0
SINUS PRESSURE: 0
BACK PAIN: 0
COLOR CHANGE: 0
UNEXPECTED WEIGHT CHANGE: 0
ABDOMINAL PAIN: 0
COUGH: 0
FEVER: 0
EYE REDNESS: 0
HEADACHES: 0
SORE THROAT: 0
TROUBLE SWALLOWING: 0
LIGHT-HEADEDNESS: 0
ABDOMINAL DISTENTION: 0
EYE PAIN: 0
APPETITE CHANGE: 0
DIZZINESS: 0
BLOOD IN STOOL: 0
EYE DISCHARGE: 0
NUMBNESS: 0
FATIGUE: 0
NERVOUS/ANXIOUS: 0
DIARRHEA: 0

## 2019-11-14 ASSESSMENT — PAIN SCALES - GENERAL: PAINLEVEL: 0

## 2019-12-02 ENCOUNTER — TELEPHONE (OUTPATIENT)
Dept: SCHEDULING | Age: 64
End: 2019-12-02

## 2019-12-02 DIAGNOSIS — K14.8 TONGUE LESION: Primary | ICD-10-CM

## 2019-12-03 ENCOUNTER — TELEPHONE (OUTPATIENT)
Dept: INTERNAL MEDICINE | Age: 64
End: 2019-12-03

## 2019-12-11 RX ORDER — LISINOPRIL 5 MG/1
TABLET ORAL
Qty: 90 TABLET | Refills: 0 | Status: SHIPPED | OUTPATIENT
Start: 2019-12-11 | End: 2020-02-03 | Stop reason: SDUPTHER

## 2020-01-01 ENCOUNTER — EXTERNAL RECORD (OUTPATIENT)
Dept: HEALTH INFORMATION MANAGEMENT | Facility: OTHER | Age: 65
End: 2020-01-01

## 2020-01-31 ENCOUNTER — TELEPHONE (OUTPATIENT)
Dept: SCHEDULING | Age: 65
End: 2020-01-31

## 2020-01-31 DIAGNOSIS — I10 BENIGN ESSENTIAL HYPERTENSION: ICD-10-CM

## 2020-01-31 RX ORDER — PANTOPRAZOLE SODIUM 40 MG/1
40 TABLET, DELAYED RELEASE ORAL DAILY
Qty: 90 TABLET | Refills: 0 | Status: SHIPPED | OUTPATIENT
Start: 2020-01-31 | End: 2020-02-03 | Stop reason: SDUPTHER

## 2020-02-03 ENCOUNTER — TELEPHONE (OUTPATIENT)
Dept: SCHEDULING | Age: 65
End: 2020-02-03

## 2020-02-03 RX ORDER — METOPROLOL SUCCINATE 25 MG/1
25 TABLET, EXTENDED RELEASE ORAL DAILY
Qty: 90 TABLET | Refills: 0 | Status: SHIPPED | OUTPATIENT
Start: 2020-02-03 | End: 2020-07-31

## 2020-02-03 RX ORDER — LISINOPRIL 5 MG/1
5 TABLET ORAL DAILY
Qty: 90 TABLET | Refills: 0 | Status: SHIPPED | OUTPATIENT
Start: 2020-02-03 | End: 2020-06-10

## 2020-02-03 RX ORDER — SIMVASTATIN 20 MG
20 TABLET ORAL DAILY
Qty: 90 TABLET | Refills: 0 | Status: SHIPPED | OUTPATIENT
Start: 2020-02-03 | End: 2020-05-01

## 2020-02-03 RX ORDER — METFORMIN HYDROCHLORIDE 500 MG/1
TABLET, EXTENDED RELEASE ORAL
Qty: 270 TABLET | Refills: 0 | Status: SHIPPED | OUTPATIENT
Start: 2020-02-03 | End: 2020-05-03

## 2020-02-03 RX ORDER — PANTOPRAZOLE SODIUM 40 MG/1
40 TABLET, DELAYED RELEASE ORAL DAILY
Qty: 90 TABLET | Refills: 0 | Status: SHIPPED | OUTPATIENT
Start: 2020-02-03 | End: 2020-07-31

## 2020-03-06 ENCOUNTER — TELEPHONE (OUTPATIENT)
Dept: SCHEDULING | Age: 65
End: 2020-03-06

## 2020-04-10 ENCOUNTER — TELEPHONE (OUTPATIENT)
Dept: SCHEDULING | Age: 65
End: 2020-04-10

## 2020-04-15 ENCOUNTER — TELEPHONE (OUTPATIENT)
Dept: CARDIOLOGY | Age: 65
End: 2020-04-15

## 2020-04-15 ENCOUNTER — TELEPHONE (OUTPATIENT)
Dept: INTERNAL MEDICINE | Age: 65
End: 2020-04-15

## 2020-04-15 ENCOUNTER — TELEPHONE (OUTPATIENT)
Dept: SCHEDULING | Age: 65
End: 2020-04-15

## 2020-04-15 DIAGNOSIS — R00.2 PALPITATIONS: ICD-10-CM

## 2020-04-15 DIAGNOSIS — I25.10 CORONARY ARTERY DISEASE INVOLVING NATIVE CORONARY ARTERY OF NATIVE HEART WITHOUT ANGINA PECTORIS: Primary | ICD-10-CM

## 2020-04-18 ENCOUNTER — TELEPHONE (OUTPATIENT)
Dept: INTERNAL MEDICINE CLINIC | Facility: CLINIC | Age: 65
End: 2020-04-18

## 2020-04-18 NOTE — TELEPHONE ENCOUNTER
Patient has not seen me since 2017. If he wants to reestablish now that is been more than 3 years he can. Otherwise please have him change his PCP to the correct PCP.

## 2020-04-27 ENCOUNTER — TELEPHONE (OUTPATIENT)
Dept: CARDIOLOGY | Age: 65
End: 2020-04-27

## 2020-05-01 RX ORDER — SIMVASTATIN 20 MG
TABLET ORAL
Qty: 90 TABLET | Refills: 0 | Status: SHIPPED | OUTPATIENT
Start: 2020-05-01 | End: 2020-07-31

## 2020-05-03 RX ORDER — METFORMIN HYDROCHLORIDE 500 MG/1
TABLET, EXTENDED RELEASE ORAL
Qty: 270 TABLET | Refills: 0 | Status: SHIPPED | OUTPATIENT
Start: 2020-05-03 | End: 2020-07-08 | Stop reason: ALTCHOICE

## 2020-05-11 RX ORDER — MELATONIN
1 DAILY
COMMUNITY
Start: 2012-05-07

## 2020-05-13 ENCOUNTER — V-VISIT (OUTPATIENT)
Dept: CARDIOLOGY | Age: 65
End: 2020-05-13
Attending: INTERNAL MEDICINE

## 2020-05-13 VITALS
HEART RATE: 72 BPM | SYSTOLIC BLOOD PRESSURE: 129 MMHG | WEIGHT: 156 LBS | BODY MASS INDEX: 23.64 KG/M2 | HEIGHT: 68 IN | DIASTOLIC BLOOD PRESSURE: 76 MMHG

## 2020-05-13 DIAGNOSIS — R00.2 PALPITATIONS: ICD-10-CM

## 2020-05-13 DIAGNOSIS — I10 BENIGN ESSENTIAL HYPERTENSION: ICD-10-CM

## 2020-05-13 DIAGNOSIS — I25.10 CORONARY ARTERY DISEASE INVOLVING NATIVE CORONARY ARTERY OF NATIVE HEART WITHOUT ANGINA PECTORIS: Primary | ICD-10-CM

## 2020-05-13 DIAGNOSIS — E78.2 MIXED HYPERLIPIDEMIA: ICD-10-CM

## 2020-05-13 PROCEDURE — 99443 TELEPHONE E&M BY PHYSICIAN EST PT NOT ORIG PREV 7 DAYS 21-30 MIN: CPT | Performed by: INTERNAL MEDICINE

## 2020-05-13 ASSESSMENT — PATIENT HEALTH QUESTIONNAIRE - PHQ9
SUM OF ALL RESPONSES TO PHQ9 QUESTIONS 1 AND 2: 0
SUM OF ALL RESPONSES TO PHQ9 QUESTIONS 1 AND 2: 0
1. LITTLE INTEREST OR PLEASURE IN DOING THINGS: NOT AT ALL
2. FEELING DOWN, DEPRESSED OR HOPELESS: NOT AT ALL

## 2020-05-19 LAB — HM DILATED EYE EXAM: NORMAL

## 2020-05-22 ENCOUNTER — HOSPITAL ENCOUNTER (OUTPATIENT)
Dept: CARDIOLOGY | Age: 65
Discharge: HOME | End: 2020-05-22
Attending: INTERNAL MEDICINE

## 2020-05-22 DIAGNOSIS — I10 BENIGN ESSENTIAL HYPERTENSION: ICD-10-CM

## 2020-05-22 DIAGNOSIS — E78.2 MIXED HYPERLIPIDEMIA: ICD-10-CM

## 2020-05-22 DIAGNOSIS — R00.2 PALPITATIONS: ICD-10-CM

## 2020-05-22 DIAGNOSIS — I25.10 CORONARY ARTERY DISEASE INVOLVING NATIVE CORONARY ARTERY OF NATIVE HEART WITHOUT ANGINA PECTORIS: ICD-10-CM

## 2020-05-22 PROCEDURE — 93270 REMOTE 30 DAY ECG REV/REPORT: CPT

## 2020-05-23 PROCEDURE — 93272 ECG/REVIEW INTERPRET ONLY: CPT | Performed by: INTERNAL MEDICINE

## 2020-05-26 ENCOUNTER — LAB ENCOUNTER (OUTPATIENT)
Dept: LAB | Facility: HOSPITAL | Age: 65
End: 2020-05-26
Attending: INTERNAL MEDICINE
Payer: COMMERCIAL

## 2020-05-26 DIAGNOSIS — I10 HTN (HYPERTENSION): ICD-10-CM

## 2020-05-26 DIAGNOSIS — I25.10 CORONARY ATHEROSCLEROSIS OF NATIVE CORONARY ARTERY: Primary | ICD-10-CM

## 2020-05-26 DIAGNOSIS — E78.2 MIXED HYPERLIPIDEMIA: ICD-10-CM

## 2020-05-26 DIAGNOSIS — R00.2 PALPITATIONS: ICD-10-CM

## 2020-05-26 LAB
ALBUMIN SERPL-MCNC: 3.8 G/DL
ALP SERPL-CCNC: 54 U/L
ALT SERPL-CCNC: 25 U/L
AST SERPL-CCNC: 19 U/L
BILIRUB SERPL-MCNC: 1.2 MG/DL
BUN SERPL-MCNC: 11 MG/DL
CALCIUM SERPL-MCNC: 8.6 MG/DL
CHLORIDE SERPL-SCNC: 103 MMOL/L
CHOLEST SERPL-MCNC: 137 MG/DL
CREAT SERPL-MCNC: 0.8 MG/DL
GLOBULIN SER-MCNC: 3.8 G/DL
GLUCOSE SERPL-MCNC: 111 MG/DL
HCT VFR BLD CALC: 39.3 %
HDLC SERPL-MCNC: 51 MG/DL
HGB BLD-MCNC: 13 G/DL
LDLC SERPL CALC-MCNC: 61 MG/DL
PLATELET # BLD: 193 10*3/UL
POTASSIUM SERPL-SCNC: 3.9 MMOL/L
PROT SERPL-MCNC: 7.6 G/DL
RBC # BLD: 4.23 10*6/UL
SODIUM SERPL-SCNC: 136 MMOL/L
TRIGL SERPL-MCNC: 126 MG/DL
TSH SERPL-ACNC: 2.96 M[IU]/L
WBC # BLD: 6.2 10*3/UL

## 2020-05-26 PROCEDURE — 85025 COMPLETE CBC W/AUTO DIFF WBC: CPT

## 2020-05-26 PROCEDURE — 84443 ASSAY THYROID STIM HORMONE: CPT

## 2020-05-26 PROCEDURE — 80061 LIPID PANEL: CPT

## 2020-05-26 PROCEDURE — 36415 COLL VENOUS BLD VENIPUNCTURE: CPT

## 2020-05-26 PROCEDURE — 80053 COMPREHEN METABOLIC PANEL: CPT

## 2020-05-27 ENCOUNTER — CLINICAL ABSTRACT (OUTPATIENT)
Dept: CARDIOLOGY | Age: 65
End: 2020-05-27

## 2020-06-10 RX ORDER — LISINOPRIL 5 MG/1
TABLET ORAL
Qty: 90 TABLET | Refills: 0 | Status: SHIPPED | OUTPATIENT
Start: 2020-06-10 | End: 2020-09-06

## 2020-07-08 ENCOUNTER — TELEPHONE (OUTPATIENT)
Dept: INTERNAL MEDICINE | Age: 65
End: 2020-07-08

## 2020-07-31 DIAGNOSIS — I10 BENIGN ESSENTIAL HYPERTENSION: ICD-10-CM

## 2020-07-31 RX ORDER — METOPROLOL SUCCINATE 25 MG/1
TABLET, EXTENDED RELEASE ORAL
Qty: 90 TABLET | Refills: 0 | Status: SHIPPED | OUTPATIENT
Start: 2020-07-31 | End: 2020-09-11 | Stop reason: SDUPTHER

## 2020-07-31 RX ORDER — PANTOPRAZOLE SODIUM 40 MG/1
TABLET, DELAYED RELEASE ORAL
Qty: 90 TABLET | Refills: 0 | Status: SHIPPED | OUTPATIENT
Start: 2020-07-31 | End: 2020-09-11

## 2020-07-31 RX ORDER — SIMVASTATIN 20 MG
TABLET ORAL
Qty: 90 TABLET | Refills: 0 | Status: SHIPPED | OUTPATIENT
Start: 2020-07-31 | End: 2020-09-13 | Stop reason: SDUPTHER

## 2020-09-06 RX ORDER — LISINOPRIL 5 MG/1
TABLET ORAL
Qty: 90 TABLET | Refills: 0 | Status: SHIPPED | OUTPATIENT
Start: 2020-09-06 | End: 2020-12-07

## 2020-09-11 ENCOUNTER — OFFICE VISIT (OUTPATIENT)
Dept: INTERNAL MEDICINE | Age: 65
End: 2020-09-11

## 2020-09-11 ENCOUNTER — LAB SERVICES (OUTPATIENT)
Dept: LAB | Age: 65
End: 2020-09-11

## 2020-09-11 VITALS
HEART RATE: 64 BPM | DIASTOLIC BLOOD PRESSURE: 78 MMHG | RESPIRATION RATE: 16 BRPM | OXYGEN SATURATION: 99 % | WEIGHT: 143 LBS | TEMPERATURE: 97.4 F | HEIGHT: 68 IN | SYSTOLIC BLOOD PRESSURE: 124 MMHG | BODY MASS INDEX: 21.67 KG/M2

## 2020-09-11 DIAGNOSIS — I25.10 CORONARY ARTERY DISEASE INVOLVING NATIVE CORONARY ARTERY OF NATIVE HEART WITHOUT ANGINA PECTORIS: ICD-10-CM

## 2020-09-11 DIAGNOSIS — L60.3 DYSTROPHIC NAIL: ICD-10-CM

## 2020-09-11 DIAGNOSIS — E78.2 MIXED HYPERLIPIDEMIA: ICD-10-CM

## 2020-09-11 DIAGNOSIS — Z12.5 PROSTATE CANCER SCREENING: ICD-10-CM

## 2020-09-11 DIAGNOSIS — I10 BENIGN ESSENTIAL HYPERTENSION: Primary | ICD-10-CM

## 2020-09-11 DIAGNOSIS — I10 BENIGN ESSENTIAL HYPERTENSION: ICD-10-CM

## 2020-09-11 DIAGNOSIS — E11.9 CONTROLLED TYPE 2 DIABETES MELLITUS WITHOUT COMPLICATION, WITHOUT LONG-TERM CURRENT USE OF INSULIN (CMD): ICD-10-CM

## 2020-09-11 LAB
ALBUMIN SERPL-MCNC: 4.1 G/DL (ref 3.6–5.1)
ALBUMIN/GLOB SERPL: 1.3 {RATIO} (ref 1–2.4)
ALP SERPL-CCNC: 52 UNITS/L (ref 45–117)
ALT SERPL-CCNC: 37 UNITS/L
ANION GAP SERPL CALC-SCNC: 9 MMOL/L (ref 10–20)
AST SERPL-CCNC: 18 UNITS/L
BILIRUB SERPL-MCNC: 1.3 MG/DL (ref 0.2–1)
BUN SERPL-MCNC: 9 MG/DL (ref 6–20)
BUN/CREAT SERPL: 12 (ref 7–25)
CALCIUM SERPL-MCNC: 8.9 MG/DL (ref 8.4–10.2)
CHLORIDE SERPL-SCNC: 103 MMOL/L (ref 98–107)
CHOLEST SERPL-MCNC: 138 MG/DL
CHOLEST/HDLC SERPL: 2.6 {RATIO}
CO2 SERPL-SCNC: 30 MMOL/L (ref 21–32)
CREAT SERPL-MCNC: 0.77 MG/DL (ref 0.67–1.17)
CREAT UR-MCNC: 25.5 MG/DL
GLOBULIN SER-MCNC: 3.1 G/DL (ref 2–4)
GLUCOSE SERPL-MCNC: 99 MG/DL (ref 65–99)
HBA1C MFR BLD: 6.2 % (ref 4.5–5.6)
HDLC SERPL-MCNC: 53 MG/DL
LDLC SERPL CALC-MCNC: 64 MG/DL
LENGTH OF FAST TIME PATIENT: 12 HRS
LENGTH OF FAST TIME PATIENT: 12 HRS
MICROALBUMIN UR-MCNC: <0.5 MG/DL
MICROALBUMIN/CREAT UR: NORMAL MG/G
NONHDLC SERPL-MCNC: 85 MG/DL
POTASSIUM SERPL-SCNC: 4.3 MMOL/L (ref 3.4–5.1)
PROT SERPL-MCNC: 7.2 G/DL (ref 6.4–8.2)
PSA SERPL-MCNC: 1.1 NG/ML
SODIUM SERPL-SCNC: 138 MMOL/L (ref 135–145)
TRIGL SERPL-MCNC: 103 MG/DL

## 2020-09-11 PROCEDURE — 99214 OFFICE O/P EST MOD 30 MIN: CPT | Performed by: INTERNAL MEDICINE

## 2020-09-11 PROCEDURE — 3074F SYST BP LT 130 MM HG: CPT | Performed by: INTERNAL MEDICINE

## 2020-09-11 PROCEDURE — 80061 LIPID PANEL: CPT | Performed by: INTERNAL MEDICINE

## 2020-09-11 PROCEDURE — 36415 COLL VENOUS BLD VENIPUNCTURE: CPT | Performed by: INTERNAL MEDICINE

## 2020-09-11 PROCEDURE — 82043 UR ALBUMIN QUANTITATIVE: CPT | Performed by: INTERNAL MEDICINE

## 2020-09-11 PROCEDURE — 83036 HEMOGLOBIN GLYCOSYLATED A1C: CPT | Performed by: INTERNAL MEDICINE

## 2020-09-11 PROCEDURE — 3078F DIAST BP <80 MM HG: CPT | Performed by: INTERNAL MEDICINE

## 2020-09-11 PROCEDURE — G0103 PSA SCREENING: HCPCS | Performed by: INTERNAL MEDICINE

## 2020-09-11 PROCEDURE — 80053 COMPREHEN METABOLIC PANEL: CPT | Performed by: INTERNAL MEDICINE

## 2020-09-11 RX ORDER — METOPROLOL SUCCINATE 25 MG/1
25 TABLET, EXTENDED RELEASE ORAL DAILY
Qty: 90 TABLET | Refills: 0 | Status: SHIPPED | OUTPATIENT
Start: 2020-09-11 | End: 2021-05-04

## 2020-09-11 RX ORDER — METOPROLOL SUCCINATE 25 MG/1
25 TABLET, EXTENDED RELEASE ORAL DAILY
Qty: 90 TABLET | Refills: 0 | Status: SHIPPED | OUTPATIENT
Start: 2020-09-11 | End: 2020-09-11 | Stop reason: SDUPTHER

## 2020-09-11 ASSESSMENT — ENCOUNTER SYMPTOMS
APPETITE CHANGE: 0
SHORTNESS OF BREATH: 0
COUGH: 0
COLOR CHANGE: 0
BACK PAIN: 0
EYE REDNESS: 0
EYE PAIN: 0
UNEXPECTED WEIGHT CHANGE: 0
WEAKNESS: 0
HEADACHES: 0
NUMBNESS: 0
SORE THROAT: 0
SINUS PRESSURE: 0
NAUSEA: 0
NERVOUS/ANXIOUS: 0
LIGHT-HEADEDNESS: 0
WHEEZING: 0
CONSTIPATION: 0
FATIGUE: 0
ABDOMINAL DISTENTION: 0
CHEST TIGHTNESS: 0
DIARRHEA: 0
EYE DISCHARGE: 0
FEVER: 0
BLOOD IN STOOL: 0
ABDOMINAL PAIN: 0
TROUBLE SWALLOWING: 0
DIZZINESS: 0

## 2020-09-13 ENCOUNTER — TELEPHONE (OUTPATIENT)
Dept: INTERNAL MEDICINE | Age: 65
End: 2020-09-13

## 2020-09-13 RX ORDER — SIMVASTATIN 20 MG
20 TABLET ORAL DAILY
Qty: 90 TABLET | Refills: 1 | Status: SHIPPED | OUTPATIENT
Start: 2020-09-13 | End: 2021-04-26

## 2020-09-19 ENCOUNTER — TELEPHONE (OUTPATIENT)
Dept: SCHEDULING | Age: 65
End: 2020-09-19

## 2020-09-21 ENCOUNTER — OFFICE VISIT (OUTPATIENT)
Dept: INTERNAL MEDICINE | Age: 65
End: 2020-09-21

## 2020-09-21 DIAGNOSIS — Z23 NEED FOR SHINGLES VACCINE: Primary | ICD-10-CM

## 2020-09-21 PROCEDURE — 90471 IMMUNIZATION ADMIN: CPT

## 2020-09-21 PROCEDURE — 90750 HZV VACC RECOMBINANT IM: CPT

## 2020-10-29 DIAGNOSIS — B36.9 FUNGAL DERMATITIS: ICD-10-CM

## 2020-10-29 RX ORDER — CLOTRIMAZOLE AND BETAMETHASONE DIPROPIONATE 10; .64 MG/G; MG/G
CREAM TOPICAL
Qty: 30 G | Refills: 0 | Status: SHIPPED | OUTPATIENT
Start: 2020-10-29 | End: 2021-03-27

## 2020-11-04 ENCOUNTER — V-VISIT (OUTPATIENT)
Dept: INTERNAL MEDICINE | Age: 65
End: 2020-11-04

## 2020-11-04 ENCOUNTER — TELEPHONE (OUTPATIENT)
Dept: SCHEDULING | Age: 65
End: 2020-11-04

## 2020-11-04 VITALS — WEIGHT: 145 LBS | TEMPERATURE: 98 F | BODY MASS INDEX: 23.3 KG/M2 | HEIGHT: 66 IN

## 2020-11-04 DIAGNOSIS — Z71.89 EDUCATED ABOUT COVID-19 VIRUS INFECTION: ICD-10-CM

## 2020-11-04 DIAGNOSIS — J20.9 ACUTE BRONCHITIS, UNSPECIFIED ORGANISM: Primary | ICD-10-CM

## 2020-11-04 PROCEDURE — 99214 OFFICE O/P EST MOD 30 MIN: CPT | Performed by: INTERNAL MEDICINE

## 2020-11-04 RX ORDER — FLUTICASONE PROPIONATE 50 MCG
2 SPRAY, SUSPENSION (ML) NASAL DAILY
Qty: 16 G | Refills: 0 | Status: SHIPPED | OUTPATIENT
Start: 2020-11-04

## 2020-11-04 RX ORDER — AZITHROMYCIN 250 MG/1
TABLET, FILM COATED ORAL
Qty: 6 TABLET | Refills: 0 | Status: SHIPPED | OUTPATIENT
Start: 2020-11-04 | End: 2021-05-17

## 2020-11-04 ASSESSMENT — ENCOUNTER SYMPTOMS
NERVOUS/ANXIOUS: 0
COLOR CHANGE: 0
SORE THROAT: 0
EYE REDNESS: 0
CHEST TIGHTNESS: 0
COUGH: 1
FATIGUE: 0
APPETITE CHANGE: 0
NUMBNESS: 0
CONSTIPATION: 0
UNEXPECTED WEIGHT CHANGE: 0
BLOOD IN STOOL: 0
ABDOMINAL DISTENTION: 0
FEVER: 0
SINUS PRESSURE: 0
NAUSEA: 0
ABDOMINAL PAIN: 0
BACK PAIN: 0
WHEEZING: 0
DIZZINESS: 0
TROUBLE SWALLOWING: 0
HEADACHES: 1
SHORTNESS OF BREATH: 0
EYE DISCHARGE: 0
WEAKNESS: 0
EYE PAIN: 0
DIARRHEA: 0
LIGHT-HEADEDNESS: 0

## 2020-11-16 ENCOUNTER — OFFICE VISIT (OUTPATIENT)
Dept: INTERNAL MEDICINE | Age: 65
End: 2020-11-16

## 2020-11-16 ENCOUNTER — TELEPHONE (OUTPATIENT)
Dept: SCHEDULING | Age: 65
End: 2020-11-16

## 2020-11-16 VITALS — WEIGHT: 148 LBS | TEMPERATURE: 98 F | HEIGHT: 66 IN | BODY MASS INDEX: 23.78 KG/M2

## 2020-11-16 DIAGNOSIS — E11.9 CONTROLLED TYPE 2 DIABETES MELLITUS WITHOUT COMPLICATION, WITHOUT LONG-TERM CURRENT USE OF INSULIN (CMD): ICD-10-CM

## 2020-11-16 DIAGNOSIS — I10 BENIGN ESSENTIAL HYPERTENSION: ICD-10-CM

## 2020-11-16 DIAGNOSIS — U07.1 COVID-19 VIRUS INFECTION: Primary | ICD-10-CM

## 2020-11-16 PROCEDURE — 99213 OFFICE O/P EST LOW 20 MIN: CPT | Performed by: INTERNAL MEDICINE

## 2020-11-16 ASSESSMENT — ENCOUNTER SYMPTOMS
LIGHT-HEADEDNESS: 0
COUGH: 0
NUMBNESS: 0
CHEST TIGHTNESS: 0
DIARRHEA: 0
CONSTIPATION: 0
NAUSEA: 0
EYE PAIN: 0
UNEXPECTED WEIGHT CHANGE: 0
SORE THROAT: 0
EYE REDNESS: 0
WEAKNESS: 0
HEADACHES: 0
DIZZINESS: 0
WHEEZING: 0
NERVOUS/ANXIOUS: 0
BACK PAIN: 0
SINUS PRESSURE: 0
ABDOMINAL PAIN: 0
ABDOMINAL DISTENTION: 0
FATIGUE: 0
BLOOD IN STOOL: 0
APPETITE CHANGE: 0
FEVER: 0
SHORTNESS OF BREATH: 0
EYE DISCHARGE: 0
TROUBLE SWALLOWING: 0
COLOR CHANGE: 0

## 2020-11-17 ENCOUNTER — TELEPHONE (OUTPATIENT)
Dept: SCHEDULING | Age: 65
End: 2020-11-17

## 2020-11-20 ENCOUNTER — TELEPHONE (OUTPATIENT)
Dept: SCHEDULING | Age: 65
End: 2020-11-20

## 2020-12-07 RX ORDER — LISINOPRIL 5 MG/1
TABLET ORAL
Qty: 90 TABLET | Refills: 1 | Status: SHIPPED | OUTPATIENT
Start: 2020-12-07 | End: 2021-06-08 | Stop reason: SDUPTHER

## 2020-12-19 ENCOUNTER — DOCUMENTATION (OUTPATIENT)
Dept: INTERNAL MEDICINE | Age: 65
End: 2020-12-19

## 2021-02-03 ENCOUNTER — TELEPHONE (OUTPATIENT)
Dept: SCHEDULING | Age: 66
End: 2021-02-03

## 2021-02-13 ENCOUNTER — TELEPHONE (OUTPATIENT)
Dept: SCHEDULING | Age: 66
End: 2021-02-13

## 2021-02-15 ENCOUNTER — APPOINTMENT (OUTPATIENT)
Dept: INTERNAL MEDICINE | Age: 66
End: 2021-02-15

## 2021-02-22 ENCOUNTER — APPOINTMENT (OUTPATIENT)
Dept: INTERNAL MEDICINE | Age: 66
End: 2021-02-22

## 2021-03-27 DIAGNOSIS — B36.9 FUNGAL DERMATITIS: ICD-10-CM

## 2021-03-27 RX ORDER — CLOTRIMAZOLE AND BETAMETHASONE DIPROPIONATE 10; .64 MG/G; MG/G
CREAM TOPICAL
Qty: 30 G | Refills: 0 | Status: SHIPPED | OUTPATIENT
Start: 2021-03-27 | End: 2021-08-30

## 2021-04-26 RX ORDER — SIMVASTATIN 20 MG
TABLET ORAL
Qty: 90 TABLET | Refills: 0 | Status: SHIPPED | OUTPATIENT
Start: 2021-04-26 | End: 2021-07-24

## 2021-05-04 DIAGNOSIS — I10 BENIGN ESSENTIAL HYPERTENSION: ICD-10-CM

## 2021-05-04 RX ORDER — METOPROLOL SUCCINATE 25 MG/1
TABLET, EXTENDED RELEASE ORAL
Qty: 90 TABLET | Refills: 0 | Status: SHIPPED | OUTPATIENT
Start: 2021-05-04 | End: 2021-08-16 | Stop reason: SDUPTHER

## 2021-05-17 ENCOUNTER — OFFICE VISIT (OUTPATIENT)
Dept: CARDIOLOGY | Age: 66
End: 2021-05-17

## 2021-05-17 VITALS
SYSTOLIC BLOOD PRESSURE: 124 MMHG | BODY MASS INDEX: 25.23 KG/M2 | HEIGHT: 66 IN | WEIGHT: 157 LBS | DIASTOLIC BLOOD PRESSURE: 66 MMHG | HEART RATE: 66 BPM

## 2021-05-17 DIAGNOSIS — I10 BENIGN ESSENTIAL HYPERTENSION: ICD-10-CM

## 2021-05-17 DIAGNOSIS — E78.2 MIXED HYPERLIPIDEMIA: ICD-10-CM

## 2021-05-17 DIAGNOSIS — I25.10 CORONARY ARTERY DISEASE INVOLVING NATIVE CORONARY ARTERY OF NATIVE HEART WITHOUT ANGINA PECTORIS: Primary | ICD-10-CM

## 2021-05-17 PROBLEM — R00.2 PALPITATIONS: Status: RESOLVED | Noted: 2020-05-13 | Resolved: 2021-05-17

## 2021-05-17 PROCEDURE — 3074F SYST BP LT 130 MM HG: CPT | Performed by: INTERNAL MEDICINE

## 2021-05-17 PROCEDURE — 93000 ELECTROCARDIOGRAM COMPLETE: CPT | Performed by: INTERNAL MEDICINE

## 2021-05-17 PROCEDURE — 99214 OFFICE O/P EST MOD 30 MIN: CPT | Performed by: INTERNAL MEDICINE

## 2021-05-17 PROCEDURE — 3078F DIAST BP <80 MM HG: CPT | Performed by: INTERNAL MEDICINE

## 2021-05-17 ASSESSMENT — PATIENT HEALTH QUESTIONNAIRE - PHQ9
CLINICAL INTERPRETATION OF PHQ2 SCORE: NO FURTHER SCREENING NEEDED
SUM OF ALL RESPONSES TO PHQ9 QUESTIONS 1 AND 2: 0
CLINICAL INTERPRETATION OF PHQ9 SCORE: NO FURTHER SCREENING NEEDED
1. LITTLE INTEREST OR PLEASURE IN DOING THINGS: NOT AT ALL
SUM OF ALL RESPONSES TO PHQ9 QUESTIONS 1 AND 2: 0
2. FEELING DOWN, DEPRESSED OR HOPELESS: NOT AT ALL

## 2021-05-18 DIAGNOSIS — I10 BENIGN ESSENTIAL HYPERTENSION: ICD-10-CM

## 2021-05-18 DIAGNOSIS — I25.10 CORONARY ARTERY DISEASE INVOLVING NATIVE CORONARY ARTERY OF NATIVE HEART WITHOUT ANGINA PECTORIS: ICD-10-CM

## 2021-05-18 DIAGNOSIS — E78.2 MIXED HYPERLIPIDEMIA: ICD-10-CM

## 2021-05-26 VITALS
DIASTOLIC BLOOD PRESSURE: 65 MMHG | WEIGHT: 162.04 LBS | TEMPERATURE: 97.6 F | BODY MASS INDEX: 24.56 KG/M2 | OXYGEN SATURATION: 100 % | SYSTOLIC BLOOD PRESSURE: 112 MMHG | RESPIRATION RATE: 16 BRPM | HEIGHT: 68 IN | HEART RATE: 69 BPM

## 2021-06-07 RX ORDER — LISINOPRIL 5 MG/1
TABLET ORAL
Qty: 90 TABLET | Refills: 0 | OUTPATIENT
Start: 2021-06-07

## 2021-06-08 RX ORDER — LISINOPRIL 5 MG/1
5 TABLET ORAL DAILY
Qty: 90 TABLET | Refills: 3 | Status: SHIPPED | OUTPATIENT
Start: 2021-06-08

## 2021-07-24 ENCOUNTER — TELEPHONE (OUTPATIENT)
Dept: INTERNAL MEDICINE | Age: 66
End: 2021-07-24

## 2021-07-24 DIAGNOSIS — I10 BENIGN ESSENTIAL HYPERTENSION: ICD-10-CM

## 2021-07-24 DIAGNOSIS — E11.9 CONTROLLED TYPE 2 DIABETES MELLITUS WITHOUT COMPLICATION, WITHOUT LONG-TERM CURRENT USE OF INSULIN (CMD): Primary | ICD-10-CM

## 2021-07-24 DIAGNOSIS — E78.2 MIXED HYPERLIPIDEMIA: ICD-10-CM

## 2021-07-24 RX ORDER — SIMVASTATIN 20 MG
TABLET ORAL
Qty: 15 TABLET | Refills: 0 | Status: SHIPPED | OUTPATIENT
Start: 2021-07-24 | End: 2021-08-05

## 2021-08-05 RX ORDER — SIMVASTATIN 20 MG
TABLET ORAL
Qty: 15 TABLET | Refills: 0 | Status: SHIPPED | OUTPATIENT
Start: 2021-08-05 | End: 2021-08-16 | Stop reason: SDUPTHER

## 2021-08-13 ENCOUNTER — TELEPHONE (OUTPATIENT)
Dept: INTERNAL MEDICINE | Age: 66
End: 2021-08-13

## 2021-08-13 DIAGNOSIS — E11.9 CONTROLLED TYPE 2 DIABETES MELLITUS WITHOUT COMPLICATION, WITHOUT LONG-TERM CURRENT USE OF INSULIN (CMD): Primary | ICD-10-CM

## 2021-08-16 ENCOUNTER — TELEPHONE (OUTPATIENT)
Dept: SCHEDULING | Age: 66
End: 2021-08-16

## 2021-08-16 DIAGNOSIS — I10 BENIGN ESSENTIAL HYPERTENSION: ICD-10-CM

## 2021-08-16 RX ORDER — SIMVASTATIN 20 MG
20 TABLET ORAL NIGHTLY
Qty: 15 TABLET | Refills: 0 | Status: SHIPPED | OUTPATIENT
Start: 2021-08-16

## 2021-08-16 RX ORDER — METOPROLOL SUCCINATE 25 MG/1
25 TABLET, EXTENDED RELEASE ORAL DAILY
Qty: 90 TABLET | Refills: 0 | Status: SHIPPED | OUTPATIENT
Start: 2021-08-16

## 2021-08-26 ENCOUNTER — TELEPHONE (OUTPATIENT)
Dept: SCHEDULING | Age: 66
End: 2021-08-26

## 2021-08-30 DIAGNOSIS — B36.9 FUNGAL DERMATITIS: ICD-10-CM

## 2021-08-30 RX ORDER — CLOTRIMAZOLE AND BETAMETHASONE DIPROPIONATE 10; .64 MG/G; MG/G
CREAM TOPICAL
Qty: 45 G | Refills: 0 | Status: SHIPPED | OUTPATIENT
Start: 2021-08-30

## 2022-01-17 ENCOUNTER — TELEPHONE (OUTPATIENT)
Dept: INTERNAL MEDICINE CLINIC | Facility: CLINIC | Age: 67
End: 2022-01-17

## 2022-01-17 ENCOUNTER — OFFICE VISIT (OUTPATIENT)
Dept: INTERNAL MEDICINE CLINIC | Facility: CLINIC | Age: 67
End: 2022-01-17
Payer: MEDICARE

## 2022-01-17 VITALS
OXYGEN SATURATION: 98 % | SYSTOLIC BLOOD PRESSURE: 128 MMHG | DIASTOLIC BLOOD PRESSURE: 80 MMHG | HEART RATE: 66 BPM | WEIGHT: 160 LBS | BODY MASS INDEX: 24.25 KG/M2 | RESPIRATION RATE: 14 BRPM | HEIGHT: 68 IN

## 2022-01-17 DIAGNOSIS — Z12.5 SCREENING PSA (PROSTATE SPECIFIC ANTIGEN): ICD-10-CM

## 2022-01-17 DIAGNOSIS — I25.10 CORONARY ARTERY DISEASE INVOLVING NATIVE CORONARY ARTERY OF NATIVE HEART WITHOUT ANGINA PECTORIS: ICD-10-CM

## 2022-01-17 DIAGNOSIS — Z12.11 SCREEN FOR COLON CANCER: ICD-10-CM

## 2022-01-17 DIAGNOSIS — I10 ESSENTIAL HYPERTENSION: ICD-10-CM

## 2022-01-17 DIAGNOSIS — E11.9 TYPE 2 DIABETES MELLITUS WITHOUT COMPLICATION, WITHOUT LONG-TERM CURRENT USE OF INSULIN (HCC): Primary | ICD-10-CM

## 2022-01-17 DIAGNOSIS — E78.5 HYPERLIPIDEMIA, UNSPECIFIED HYPERLIPIDEMIA TYPE: ICD-10-CM

## 2022-01-17 PROCEDURE — 99204 OFFICE O/P NEW MOD 45 MIN: CPT | Performed by: INTERNAL MEDICINE

## 2022-01-17 PROCEDURE — 3079F DIAST BP 80-89 MM HG: CPT | Performed by: INTERNAL MEDICINE

## 2022-01-17 PROCEDURE — 3008F BODY MASS INDEX DOCD: CPT | Performed by: INTERNAL MEDICINE

## 2022-01-17 PROCEDURE — 3074F SYST BP LT 130 MM HG: CPT | Performed by: INTERNAL MEDICINE

## 2022-01-17 RX ORDER — ASPIRIN 81 MG/1
81 TABLET ORAL DAILY
Qty: 90 TABLET | Refills: 1 | Status: SHIPPED | OUTPATIENT
Start: 2022-01-17 | End: 2022-04-17

## 2022-01-17 RX ORDER — LISINOPRIL 5 MG/1
5 TABLET ORAL DAILY
Qty: 90 TABLET | Refills: 1 | Status: SHIPPED | OUTPATIENT
Start: 2022-01-17

## 2022-01-17 RX ORDER — SIMVASTATIN 20 MG
20 TABLET ORAL NIGHTLY
Qty: 90 TABLET | Refills: 1 | Status: SHIPPED | OUTPATIENT
Start: 2022-01-17

## 2022-01-17 RX ORDER — METOPROLOL SUCCINATE 25 MG/1
TABLET, EXTENDED RELEASE ORAL
Qty: 90 TABLET | Refills: 1 | Status: SHIPPED | OUTPATIENT
Start: 2022-01-17

## 2022-01-17 RX ORDER — SIMVASTATIN 20 MG
20 TABLET ORAL NIGHTLY
COMMUNITY
End: 2022-01-17

## 2022-01-17 NOTE — PROGRESS NOTES
Rick Leslie is a 73year old male. Patient presents with:  Establish Care: NP here to establish care     HPI:   78-year-old gentleman with medical history significant for coronary disease status post PCI, hypertension, diabetes, dyslipidemia, chroni • [facility] 4/23[facility]2015   • Essential hypertension with goal blood pressure less than 140/90 11/14/2016   • Fungal infection     Toingue.  bx.    • Other and unspecified hyperlipidemia    • Type II or unspecified typ kg)  05/16/16 : 176 lb (79.8 kg)    Body mass index is 24.33 kg/m².       EXAM:   /80 (BP Location: Right arm, Patient Position: Sitting, Cuff Size: adult)   Pulse 66   Resp 14   Ht 5' 8\" (1.727 m)   Wt 160 lb (72.6 kg)   SpO2 98%   BMI 24.33 kg/m² function and thyroid function test  - TSH W REFLEX TO FREE T4; Future    4. Coronary artery disease involving native coronary artery of native heart without angina pectoris  Status post PCI-stable with unremarkable cardiac review of systems.   Continue aspi

## 2022-01-18 ENCOUNTER — LAB ENCOUNTER (OUTPATIENT)
Dept: LAB | Age: 67
End: 2022-01-18
Attending: INTERNAL MEDICINE
Payer: MEDICARE

## 2022-01-18 DIAGNOSIS — E11.9 TYPE 2 DIABETES MELLITUS WITHOUT COMPLICATION, WITHOUT LONG-TERM CURRENT USE OF INSULIN (HCC): ICD-10-CM

## 2022-01-18 DIAGNOSIS — I10 ESSENTIAL HYPERTENSION: ICD-10-CM

## 2022-01-18 DIAGNOSIS — Z12.5 SCREENING PSA (PROSTATE SPECIFIC ANTIGEN): ICD-10-CM

## 2022-01-18 LAB
ALBUMIN SERPL-MCNC: 4 G/DL (ref 3.4–5)
ALBUMIN/GLOB SERPL: 1.2 {RATIO} (ref 1–2)
ALP LIVER SERPL-CCNC: 54 U/L
ALT SERPL-CCNC: 33 U/L
ANION GAP SERPL CALC-SCNC: 3 MMOL/L (ref 0–18)
AST SERPL-CCNC: 20 U/L (ref 15–37)
BILIRUB SERPL-MCNC: 1.3 MG/DL (ref 0.1–2)
BUN BLD-MCNC: 11 MG/DL (ref 7–18)
BUN/CREAT SERPL: 13.3 (ref 10–20)
CALCIUM BLD-MCNC: 9.2 MG/DL (ref 8.5–10.1)
CHLORIDE SERPL-SCNC: 101 MMOL/L (ref 98–112)
CHOLEST SERPL-MCNC: 150 MG/DL (ref ?–200)
CO2 SERPL-SCNC: 30 MMOL/L (ref 21–32)
COMPLEXED PSA SERPL-MCNC: 1.18 NG/ML (ref ?–4)
CREAT BLD-MCNC: 0.83 MG/DL
CREAT UR-SCNC: 50.4 MG/DL
DEPRECATED RDW RBC AUTO: 41.8 FL (ref 35.1–46.3)
ERYTHROCYTE [DISTWIDTH] IN BLOOD BY AUTOMATED COUNT: 11.9 % (ref 11–15)
EST. AVERAGE GLUCOSE BLD GHB EST-MCNC: 134 MG/DL (ref 68–126)
FASTING PATIENT LIPID ANSWER: YES
FASTING STATUS PATIENT QL REPORTED: YES
GLOBULIN PLAS-MCNC: 3.3 G/DL (ref 2.8–4.4)
GLUCOSE BLD-MCNC: 106 MG/DL (ref 70–99)
HBA1C MFR BLD: 6.3 % (ref ?–5.7)
HCT VFR BLD AUTO: 41 %
HDLC SERPL-MCNC: 60 MG/DL (ref 40–59)
HGB BLD-MCNC: 13.4 G/DL
LDLC SERPL CALC-MCNC: 68 MG/DL (ref ?–100)
MCH RBC QN AUTO: 30.9 PG (ref 26–34)
MCHC RBC AUTO-ENTMCNC: 32.7 G/DL (ref 31–37)
MCV RBC AUTO: 94.7 FL
MICROALBUMIN UR-MCNC: 0.62 MG/DL
MICROALBUMIN/CREAT 24H UR-RTO: 12.3 UG/MG (ref ?–30)
NONHDLC SERPL-MCNC: 90 MG/DL (ref ?–130)
OSMOLALITY SERPL CALC.SUM OF ELEC: 278 MOSM/KG (ref 275–295)
PLATELET # BLD AUTO: 217 10(3)UL (ref 150–450)
POTASSIUM SERPL-SCNC: 4 MMOL/L (ref 3.5–5.1)
PROT SERPL-MCNC: 7.3 G/DL (ref 6.4–8.2)
RBC # BLD AUTO: 4.33 X10(6)UL
SODIUM SERPL-SCNC: 134 MMOL/L (ref 136–145)
TRIGL SERPL-MCNC: 126 MG/DL (ref 30–149)
TSI SER-ACNC: 2.52 MIU/ML (ref 0.36–3.74)
VLDLC SERPL CALC-MCNC: 19 MG/DL (ref 0–30)
WBC # BLD AUTO: 6.4 X10(3) UL (ref 4–11)

## 2022-01-18 PROCEDURE — 3044F HG A1C LEVEL LT 7.0%: CPT | Performed by: INTERNAL MEDICINE

## 2022-01-18 PROCEDURE — 36415 COLL VENOUS BLD VENIPUNCTURE: CPT

## 2022-01-18 PROCEDURE — 82570 ASSAY OF URINE CREATININE: CPT

## 2022-01-18 PROCEDURE — 83036 HEMOGLOBIN GLYCOSYLATED A1C: CPT

## 2022-01-18 PROCEDURE — 3061F NEG MICROALBUMINURIA REV: CPT | Performed by: INTERNAL MEDICINE

## 2022-01-18 PROCEDURE — 80053 COMPREHEN METABOLIC PANEL: CPT

## 2022-01-18 PROCEDURE — 85027 COMPLETE CBC AUTOMATED: CPT

## 2022-01-18 PROCEDURE — 80061 LIPID PANEL: CPT

## 2022-01-18 PROCEDURE — 82043 UR ALBUMIN QUANTITATIVE: CPT

## 2022-01-18 PROCEDURE — 84443 ASSAY THYROID STIM HORMONE: CPT

## 2022-01-20 RX ORDER — CLOTRIMAZOLE AND BETAMETHASONE DIPROPIONATE 10; .64 MG/G; MG/G
1 CREAM TOPICAL 2 TIMES DAILY
Qty: 45 G | Refills: 0 | Status: SHIPPED | OUTPATIENT
Start: 2022-01-20

## 2022-01-20 NOTE — TELEPHONE ENCOUNTER
Spoke with pt daughter who states pt has been using prescription for many years for reoccurring fungal infection he gets on his inner thighs. Per daughter redness and itching occur on inner thighs and clotrimazole-betamethasone helps resolve the symptoms.

## 2022-04-11 RX ORDER — METOPROLOL SUCCINATE 25 MG/1
TABLET, EXTENDED RELEASE ORAL
Qty: 90 TABLET | Refills: 1 | Status: SHIPPED | OUTPATIENT
Start: 2022-04-11 | End: 2022-08-15

## 2022-04-11 RX ORDER — LISINOPRIL 5 MG/1
5 TABLET ORAL DAILY
Qty: 90 TABLET | Refills: 1 | Status: SHIPPED | OUTPATIENT
Start: 2022-04-11 | End: 2023-10-09

## 2022-04-11 RX ORDER — SIMVASTATIN 20 MG
20 TABLET ORAL NIGHTLY
Qty: 90 TABLET | Refills: 1 | Status: SHIPPED | OUTPATIENT
Start: 2022-04-11 | End: 2022-11-07

## 2022-04-11 NOTE — TELEPHONE ENCOUNTER
Refill passed per [place] [employer] [place], [facility] protocol. Requested Prescriptions   Pending Prescriptions Disp Refills    lisinopril (ZESTRIL) 5 MG Oral Tab 90 tablet 1     Sig: Take 1 tablet (5 mg total) by mouth daily.         Hypertensive Medications Protocol Passed - 4/11/2022  2:22 PM        Passed - CMP or BMP in past 12 months        Passed - Appointment in past 6 or next 3 months        Passed - GFR Non- > 50     Lab Results   Component Value Date    [place] [id number] 01/18/2022                        Future Appointments         Provider Department Appt Notes    In 1 month Rafael Young MD [place] [employer] [place], [facility], [address] [facility]    In 2 months Joe Turner MD [facility] Ophthalmology np ee             Recent Outpatient Visits              2 months ago Type 2 diabetes mellitus without complication, without long-term current use of insulin [facility])    Aretha Frausto MD    Office Visit    5 years ago Immunization due    Sagar Vasquez MD    Office Visit    5 years ago Diabetes mellitus type 2 without retinopathy [facility])    [facility] Joe Turner MD    Office Visit    5 years ago Annual physical exam    Ismael Howell MD    Office Visit    5 years ago Type 2 diabetes mellitus without complication [facility])    Sean Frausto MD    Office Visit

## 2022-04-11 NOTE — TELEPHONE ENCOUNTER
Refill passed per [facility] protocol.      Requested Prescriptions   Pending Prescriptions Disp Refills    METOPROLOL SUCCINATE ER 25 MG Oral Tablet 24 Hr [Pharmacy Med Name: METOPROLOL SUCCINATE 25MG TAB] 90 tablet 0     Si tab po q d        Hypertensive Medications Protocol Passed - 2022 11:09 AM        Passed - CMP or BMP in past 12 months        Passed - Appointment in past 6 or next 3 months        Passed - GFR Non- > 50     Lab Results   Component Value Date    GFRNAA 92 2022                    SIMVASTATIN 20 MG Oral Tab [Pharmacy Med Name: SIMVASTATIN 20MG TAB] 90 tablet 0     Sig: TAKE ONE TABLET BY MOUTH AT BEDTIME (CHOLESTEROL)        Cholesterol Medication Protocol Passed - 2022 11:09 AM        Passed - ALT in past 12 months        Passed - LDL in past 12 months        Passed - Last ALT < 80       Lab Results   Component Value Date    ALT 33 2022             Passed - Last LDL < 130     Lab Results   Component Value Date    LDL 68 2022               Passed - Appointment in past 12 or next 3 months                Recent Outpatient Visits              2 months ago Type 2 diabetes mellitus without complication, without long-term current use of insulin [facility])    Priscila Rodriguez, Aretha Rojas MD    Office Visit    5 years ago Immunization due    Sagar Vasquez MD    Office Visit    5 years ago Diabetes mellitus type 2 without retinopathy [facility])    [facility] Joe Turner MD    Office Visit    5 years ago Annual physical exam    Ismael Howell MD    Office Visit    5 years ago Type 2 diabetes mellitus without complication [facility])    [facility], [address] [facility] Sean Kelley MD    Office Visit             Future Appointments         Provider Department Appt Notes    In 1 month Aguila Moore Dennis Croft MD [facility], [facility], [facility] [address]    In 2 months Eddie Carrasquillo MD [facility] for [facility] Ophthalmology np ee

## 2022-05-06 ENCOUNTER — TELEPHONE (OUTPATIENT)
Dept: CASE MANAGEMENT | Age: 67
End: 2022-05-06

## 2022-05-06 NOTE — TELEPHONE ENCOUNTER
Patient is due for colorectal screening. GI referral written 1/17/22. Left message to call back. *Please assist pt in scheduling GI consult.

## 2022-05-09 RX ORDER — POLYETHYLENE GLYCOL 3350, SODIUM CHLORIDE, SODIUM BICARBONATE, POTASSIUM CHLORIDE 420; 11.2; 5.72; 1.48 G/4L; G/4L; G/4L; G/4L
POWDER, FOR SOLUTION ORAL
Qty: 4000 ML | Refills: 0 | Status: SHIPPED | OUTPATIENT
Start: 2022-05-09

## 2022-05-09 RX ORDER — ASPIRIN 81 MG/1
81 TABLET ORAL DAILY
COMMUNITY

## 2022-05-09 NOTE — TELEPHONE ENCOUNTER
Clarisse Hoover    Request from PCP for colorectal screening since overdue. Questions reviewed with daughter Alicia Arita per patient request.  Please provide orders if appropriate. Thank you    Last Procedure, Date, MD:  Dr. Rory Chang Colonoscopy and EGD 2/28/2009  Last Diagnosis:  Internal hemorrhoids, small gastric nodules, mild reflux changes distal esophagus  Recalled for (mth/yrs): 10 years  Sedation used previously:  IV  Last Prep Used (if known):  n/a  Quality of prep (if known): adequate  Anticoagulants:no  Diabetic Meds: Metformin  BP meds(Ace inhibitors/ARB's):Lisinopril  Weight loss meds (phentermine/vyvanse):no  Iron supplement (RX/OTC): no  Height & Weight/BMI: 5'8\" 160lbs BMI 24.33  Hx of Cardiac/CVA issues/(MI/Stroke): MI 2002  Devices Pacemaker/Defibrillator/Stents: stents  Resp. Issues/Oxygen Use/IRENE/COPD:no  Issues w/Anesthesia:no    Symptoms (Y/N): no  Symptoms Details: no    Special comments/notes:n/a    Please advise on orders and prep, thank you.

## 2022-05-09 NOTE — TELEPHONE ENCOUNTER
The patient's chart has been reviewed. Okay to schedule pt for 10 year CLN recall r/t screening with Dr. Rory Chang. Advise MAC sedation r/t cardiac hx with split dose Colyte/TriLyte or equivalent(eRx) preparation.   -Eligible for NE: No r/t cardiac hx  -Denies history of bleeding/clotting disorders.      -Please note: It is the patient's responsibility to contact his/her insurance company regarding questions about out-of-pocket cost/benefits. Please provide the appropriate diagnostic information/codes.      May continue all medications listed in the med module except:  -Anti-platelets and anti-coagulants: ASA - continue as prescribed  -Diabetes meds: Hold metformin day before/day of procedure    ** If MAC:    - HOLD lisinopril the night before and/or the day of the procedure(s)    - NO alcohol, recreational drugs nor erectile dysfunction medications 24 hours before procedure(s)   - NO herbal supplements or weight loss medications (phentermine/Vyvanse/Adderall) x 7 days prior to the procedure(s)    ** If MAC @ [facility] or [place] - continue all medications as prescribed    ** COVID-19 testing required 72 hours prior to procedure    **Patient to follow-up with any new medical history/medications prior to procedure**

## 2022-05-09 NOTE — TELEPHONE ENCOUNTER
Daughter called back (RICHA), informed tht patient is due for his colorectal screening, GI specialist's informations provided Dr Emily Lester [phone]. Was wrong sent to nephro pool,re sent to GI.

## 2022-05-16 ENCOUNTER — NURSE ONLY (OUTPATIENT)
Dept: GASTROENTEROLOGY | Facility: CLINIC | Age: 67
End: 2022-05-16

## 2022-05-16 DIAGNOSIS — Z12.11 SCREEN FOR COLON CANCER: Primary | ICD-10-CM

## 2022-05-16 NOTE — PROGRESS NOTES
[facility] daughter Claudetta Revere (found on RICHA) for a scheduled telephone colon screening. GI MD preference: Recall Dr. Paula Rubio:  Nivia Rose from: 1/18/2022 show no signs of anemia   PCP visit on: 1/17/2022     Last Procedure, Date, MD:  CLN 6/8/2009 Dr. Albert Charles; Upper scope 6/12/2012    Anticoagulants: no  Diabetic Meds: metformin   BP meds(Ace inhibitors/ARB's): LISINOPRIL AM dose   Weight loss meds (phentermine/vyvanse): no  Iron supplement (RX/OTC): no  Height & Weight/BMI: 5'8\"/160lbs/24  Hx of Cardiac/CVA issues/(MI/Stroke): MI 2002  Devices Pacemaker/Defibrillator/Stents: Coronary STENTS  Resp. Issues/Oxygen Use/IRENE/COPD: no   Issues w/Anesthesia: no    Symptoms (Y/N): no    Family history of colon cancer: no    Medications, pharmacy, and allergies verified with patient over the phone. Please advise on orders and prep, thank you.

## 2022-05-16 NOTE — PROGRESS NOTES
Operative Report  Thiago Hansen MD (Physician) [facility]  Unsigned  Patient:   Barber SANTANA #:   [id number]                    Room: [facility]  Adil[place] DO #:  [id number]     ADMITTED:   07/07/2012        DATE:  7/7/2012     SURGEON:  Dr. Libby Arora     PRE-PROCEDURE DIAGNOSES:  1. Dyspeptic type symptoms. 2. Anemia. POST-PROCEDURE DIAGNOSES:  1. Small lipomatous submucosal nodule in the antrum of the   stomach, 1 cm.  2. Otherwise normal examination. PROCEDURE:  Upper endoscopy. TECHNIQUE:  After informed consent was obtained and all questions   answered,  the patient was sedated with divided doses of Versed 8 mg and   fentanyl 75  mcg. The patient was positioned in the left lateral decubitus   position. The lubricated tip of the Olympus video upper endoscope was   inserted into  the mouth and passed to the second portion of the duodenum. Scope   was then  gradually withdraw. Careful examination of the mucosa was   performed on both  insertion and removal.  Retroflexion was performed in the body of   the  stomach and more proximal stomach was examined. The patient   tolerated the  procedure well. No immediate complications. Findings as outlined   below. ASA class was 2. FINDINGS:  1. Small yellowish appearing submucosal nodule, approximately 1 cm   in size,     in the antrum of the stomach. Biopsies taken. Likely   lipomatous lesion. 2. Normal appearing duodenum. Biopsies taken to exclude celiac   given his     symptoms and mild anemia. 3. Otherwise normal appearing esophagus and proximal stomach. RECOMMENDATIONS:  1. Followup on biopsy results. 2. Continue symptomatic treatment, possible functional abdominal   issues. 3. Further workup and evaluation of anemia as per the patient's   primary     physician, Dr. Maria Del Rosario Carter. Recent stool heme testing was negative. D:    07/07/201211:42 A  T:    07/08/2012  9:47 P  ATTENDING:  Chantel Melo.  Toña Burton MD [address] [facility]  DICTATING:    Judy Burton MD [id number]  MD ID #:      [id number]  cc:   Judy Burton, [facility], [address] [facility]        JOB NUMBER:         [id number]  KIMBERLEE LAU [facility] #: [id number]. cxm  MR #: [id number]                  Patient: Barber Hull

## 2022-05-17 RX ORDER — POLYETHYLENE GLYCOL 3350, SODIUM CHLORIDE, SODIUM BICARBONATE, POTASSIUM CHLORIDE 420; 11.2; 5.72; 1.48 G/4L; G/4L; G/4L; G/4L
POWDER, FOR SOLUTION ORAL
Qty: 4000 ML | Refills: 0 | Status: SHIPPED | OUTPATIENT
Start: 2022-05-17

## 2022-05-17 NOTE — PROGRESS NOTES
The patient's chart has been reviewed. Okay to schedule pt for CLN recall r/t screening with Dr. Shira Mancuso. Advise MAC sedation r/t cardiac hx with split dose Colyte/TriLyte or equivalent(eRx) preparation.   -Eligible for NE: no r/t cardiac hx  -Denies history of bleeding/clotting disorders.      -Please note: It is the patient's responsibility to contact his/her insurance company regarding questions about out-of-pocket cost/benefits. Please provide the appropriate diagnostic information/codes.      May continue all medications listed in the med module except:  -Anti-platelets and anti-coagulants: ASA - continue as prescribed  -Diabetes meds: Hold metformin day before/day of procedure    ** If MAC:    - HOLD lisinopril the night before and/or the day of the procedure(s)    - NO alcohol, recreational drugs nor erectile dysfunction medications 24 hours before procedure(s)   - NO herbal supplements or weight loss medications (phentermine/Vyvanse/Adderall) x 7 days prior to the procedure(s)    ** If MAC @ [facility] or IV [place] - continue all medications as prescribed    ** COVID-19 testing required 72 hours prior to procedure    **Patient to follow-up with any new medical history/medications prior to procedure**

## 2022-05-18 ENCOUNTER — APPOINTMENT (OUTPATIENT)
Dept: CARDIOLOGY | Age: 67
End: 2022-05-18

## 2022-07-09 ENCOUNTER — OFFICE VISIT (OUTPATIENT)
Dept: OPHTHALMOLOGY | Facility: CLINIC | Age: 67
End: 2022-07-09
Payer: MEDICARE

## 2022-07-09 ENCOUNTER — OFFICE VISIT (OUTPATIENT)
Dept: INTERNAL MEDICINE CLINIC | Facility: CLINIC | Age: 67
End: 2022-07-09
Payer: MEDICARE

## 2022-07-09 VITALS
DIASTOLIC BLOOD PRESSURE: 80 MMHG | OXYGEN SATURATION: 99 % | HEART RATE: 68 BPM | RESPIRATION RATE: 14 BRPM | HEIGHT: 68 IN | BODY MASS INDEX: 24.55 KG/M2 | SYSTOLIC BLOOD PRESSURE: 110 MMHG | WEIGHT: 162 LBS

## 2022-07-09 DIAGNOSIS — H43.22 ASTEROID HYALOSIS OF LEFT EYE: ICD-10-CM

## 2022-07-09 DIAGNOSIS — I25.10 CORONARY ARTERY DISEASE INVOLVING NATIVE CORONARY ARTERY OF NATIVE HEART WITHOUT ANGINA PECTORIS: ICD-10-CM

## 2022-07-09 DIAGNOSIS — K14.8 LESION OF TONGUE: ICD-10-CM

## 2022-07-09 DIAGNOSIS — H25.13 AGE-RELATED NUCLEAR CATARACT OF BOTH EYES: ICD-10-CM

## 2022-07-09 DIAGNOSIS — I25.9 CHRONIC ISCHEMIC HEART DISEASE: Primary | ICD-10-CM

## 2022-07-09 DIAGNOSIS — E78.5 HYPERLIPIDEMIA, UNSPECIFIED HYPERLIPIDEMIA TYPE: ICD-10-CM

## 2022-07-09 DIAGNOSIS — N40.0 BENIGN PROSTATIC HYPERPLASIA WITHOUT LOWER URINARY TRACT SYMPTOMS: ICD-10-CM

## 2022-07-09 DIAGNOSIS — I10 ESSENTIAL HYPERTENSION: ICD-10-CM

## 2022-07-09 DIAGNOSIS — E11.9 DIABETES MELLITUS TYPE 2 WITHOUT RETINOPATHY (HCC): Primary | ICD-10-CM

## 2022-07-09 DIAGNOSIS — Z12.5 SCREENING PSA (PROSTATE SPECIFIC ANTIGEN): ICD-10-CM

## 2022-07-09 DIAGNOSIS — E11.9 DIABETES MELLITUS TYPE 2 WITHOUT RETINOPATHY (HCC): ICD-10-CM

## 2022-07-09 DIAGNOSIS — E55.9 VITAMIN D DEFICIENCY: ICD-10-CM

## 2022-07-09 DIAGNOSIS — I83.90 UNCOMPLICATED VARICOSE VEINS: ICD-10-CM

## 2022-07-09 PROCEDURE — 3079F DIAST BP 80-89 MM HG: CPT | Performed by: INTERNAL MEDICINE

## 2022-07-09 PROCEDURE — 3008F BODY MASS INDEX DOCD: CPT | Performed by: INTERNAL MEDICINE

## 2022-07-09 PROCEDURE — 3074F SYST BP LT 130 MM HG: CPT | Performed by: INTERNAL MEDICINE

## 2022-07-09 PROCEDURE — G0402 INITIAL PREVENTIVE EXAM: HCPCS | Performed by: INTERNAL MEDICINE

## 2022-07-09 PROCEDURE — 2023F DILAT RTA XM W/O RTNOPTHY: CPT | Performed by: OPHTHALMOLOGY

## 2022-07-09 PROCEDURE — 99397 PER PM REEVAL EST PAT 65+ YR: CPT | Performed by: INTERNAL MEDICINE

## 2022-07-09 PROCEDURE — 96160 PT-FOCUSED HLTH RISK ASSMT: CPT | Performed by: INTERNAL MEDICINE

## 2022-07-09 PROCEDURE — 92015 DETERMINE REFRACTIVE STATE: CPT | Performed by: OPHTHALMOLOGY

## 2022-07-09 PROCEDURE — 92004 COMPRE OPH EXAM NEW PT 1/>: CPT | Performed by: OPHTHALMOLOGY

## 2022-07-09 NOTE — PATIENT INSTRUCTIONS
Age-related nuclear cataract of both eyes  Discussed mild cataracts in both eyes that are not affecting vision and are not surgical at this time. Recommend progressive bifocals for best corrected vision at distance and near. Diabetes mellitus type 2 without retinopathy ([address]  Diabetes type II: no background of retinopathy, no signs of neovascularization noted. Discussed ocular and systemic benefits of blood sugar control. Diagnosis and treatment discussed in detail with patient.

## 2022-07-09 NOTE — ASSESSMENT & PLAN NOTE
Discussed mild cataracts in both eyes that are not affecting vision and are not surgical at this time. Recommend progressive bifocals for best corrected vision at distance and near.

## 2022-08-15 RX ORDER — METOPROLOL SUCCINATE 25 MG/1
TABLET, EXTENDED RELEASE ORAL
Qty: 90 TABLET | Refills: 0 | Status: SHIPPED | OUTPATIENT
Start: 2022-08-15

## 2022-09-12 RX ORDER — METOPROLOL SUCCINATE 25 MG/1
TABLET, EXTENDED RELEASE ORAL
Qty: 90 TABLET | Refills: 1 | OUTPATIENT
Start: 2022-09-12

## 2022-09-12 RX ORDER — METOPROLOL SUCCINATE 25 MG/1
TABLET, EXTENDED RELEASE ORAL
Qty: 90 TABLET | Refills: 1 | Status: SHIPPED | OUTPATIENT
Start: 2022-09-12

## 2022-09-12 NOTE — TELEPHONE ENCOUNTER
Protocol failed or has No Protocol, please review  Requested Prescriptions   Pending Prescriptions Disp Refills    METOPROLOL SUCCINATE ER 25 MG Oral Tablet 24 Hr [Pharmacy Med Name: METOPROLOL SUCCINATE 25MG TAB] 90 tablet 0     Si TAB BY MOUTH EVERY DAY        Hypertensive Medications Protocol Failed - 2022  9:47 AM        Failed - CMP or BMP in past 6 months     No results found for this or any previous visit (from the past 4392 hour(s)).               Passed - In person appointment in the past 12 or next 3 months       Recent Outpatient Visits              2 months ago Chronic ischemic heart disease    [address], Ingrid Mullen MD    Office Visit    2 months ago Diabetes mellitus type 2 without retinopathy [facility])    [facility] Alanna Negrete MD    Office Visit    3 months ago Screen for colon cancer    [facility] GI PROCEDURE    Nurse Only    7 months ago Type 2 diabetes mellitus without complication, without long-term current use of insulin [facility])    [facility], [address] Ingrid Almeida MD    Office Visit    5 years ago Immunization due    [facility], [facility] Kenan solitario MD    Office Visit     Future Appointments         Provider Department Appt Notes    In 2 months [facility], PROCEDURE [facility] GI PROCEDURE [place] @ [address] [place] [address]    In 4 months [address] MD [facility], [address] Sean [address]me[address] 6 mo f/u               Passed - Last BP reading less than 140/90     BP Readings from Last 1 Encounters:  / : 110/80                Passed - In person appointment or virtual visit in the past 6 months       Recent Outpatient Visits              2 months ago Chronic ischemic heart disease    [employer], [address], Ingrid Mullen MD    Office Visit    2 months ago Diabetes mellitus type 2 without retinopathy [facility])    [facility] Alanna Negrete MD    Office Visit    3 months ago Screen for colon cancer    [address] [facility] PROCEDURE    Nurse Only    7 months ago Type 2 diabetes mellitus without complication, without long-term current use of insulin [facility])    [facility], [address] Jv Almeida MD    Office Visit    5 years ago Immunization due    [facility], [facility], Sana Short MD    Office Visit     Future Appointments         Provider Department Appt Notes    In 2 months [facility], PROCEDURE [address] [facility] PROCEDURE Shelia @ [address] [place] [address]    In 4 months To Daniels MD [facility], [address] 6 mo f/u               Passed - GFR > 50     No results found for: EGFRCR                 METOPROLOL SUCCINATE ER 25 MG Oral Tablet 24 Hr [Pharmacy Med Name: METOPROLOL SUCCINATE 25MG TAB] 90 tablet 0     Si tab po q d        Hypertensive Medications Protocol Failed - 2022  9:47 AM        Failed - CMP or BMP in past 6 months     No results found for this or any previous visit (from the past 4392 hour(s)).               Passed - In person appointment in the past 12 or next 3 months       Recent Outpatient Visits              2 months ago Chronic ischemic heart disease    [facility], [address], MD Jv    Office Visit    2 months ago Diabetes mellitus type 2 without retinopathy [facility])    [facility] Iman Morfin MD    Office Visit    3 months ago Screen for colon cancer    [address] [facility] PROCEDURE    Nurse Only    7 months ago Type 2 diabetes mellitus without complication, without long-term current use of insulin [facility])    [facility], [address] Jv Almeida MD    Office Visit    5 years ago Immunization due    Nakia Payne MD    Office Visit     Future Appointments         Provider Department Appt Notes    In 2 months [facility], PROCEDURE [address] [facility] PROCEDURE Shelia @ [address] [place] [address]    In 4 months Brooksie Ahumada, MD [address], [address] 6 mo f/u               Passed - Last BP reading less than 140/90     BP Readings from Last 1 Encounters:  07/09/22 : 110/80                Passed - In person appointment or virtual visit in the past 6 months       Recent Outpatient Visits              2 months ago Chronic ischemic heart disease    [address], [address], Shadia Bernal MD    Office Visit    2 months ago Diabetes mellitus type 2 without retinopathy [facility])    [facility] Mayur Mckenzie MD    Office Visit    3 months ago Screen for colon cancer    [facility] GI PROCEDURE    Nurse Only    7 months ago Type 2 diabetes mellitus without complication, without long-term current use of insulin [facility])    [address], [address] Shadia Almeida MD    Office Visit    5 years ago Immunization due    [address] Sheree Leeks., MD    Office Visit     Future Appointments         Provider Department Appt Notes    In 2 months [facility], PROCEDURE [facility] GI PROCEDURE [place] @ [address]    In 4 months Brooksie Ahumada, MD [address], [address] [place], [place] 6 mo f/u               Passed - GFR > 50     No results found for: [facility]                  Future Appointments         Provider Department Appt Notes    In 2 months [facility], PROCEDURE [facility] GI PROCEDURE [place] @ [address]    In 4 months Brooksie Ahumada, MD [address], [address] [place], [place] 6 mo f/u          Recent Outpatient Visits              2 months ago Chronic ischemic heart disease    [address], [address], Shadia Bernal MD    Office Visit    2 months ago Diabetes mellitus type 2 without retinopathy [facility])    [facility] Mayur Mckenzie MD    Office Visit    3 months ago Screen for colon cancer    [facility] GI PROCEDURE    Nurse Only    7 months ago Type 2 diabetes mellitus without complication, without long-term current use of insulin [facility])    Jazmin Crockett MD    Office Visit    5 years ago Immunization due    Anju Smith MD    Office Visit

## 2022-10-20 ENCOUNTER — TELEPHONE (OUTPATIENT)
Dept: GASTROENTEROLOGY | Facility: CLINIC | Age: 67
End: 2022-10-20

## 2022-11-01 NOTE — TELEPHONE ENCOUNTER
Please review. Protocol failed / No protocol.     Requested Prescriptions   Pending Prescriptions Disp Refills    METFORMIN 500 MG Oral Tab [Pharmacy Med Name: Brandon Zamora 500MG TAB] 270 tablet 0     Si tab AM and 2 Tabs PM       Diabetes Medication Protocol Failed - 2022  9:38 AM        Failed - Last A1C < 7.5 and within past 6 months     Lab Results   Component Value Date    A1C 6.3 (H) 2022             Passed - In person appointment or virtual visit in the past 6 mos or appointment in next 3 mos     Recent Outpatient Visits              3 months ago Chronic ischemic heart disease    [address] [facility], [address], MD Jv    Office Visit    3 months ago Diabetes mellitus type 2 without retinopathy [facility])    [facility] Osmel [address] MD    Office Visit    5 months ago Screen for colon cancer    [facility] GI PROCEDURE    Nurse Only    9 months ago Type 2 diabetes mellitus without complication, without long-term current use of insulin [facility])    [address] [place], MD    Office Visit    5 years ago Immunization due    [address] [place] Yovanny solitario MD    Office Visit          Future Appointments         Provider Department Appt Notes    In 2 months Johann Gongora MD [address] [place] 6 mo f/u               Passed - [facility] or GFRNAA > 50     GFR Evaluation  GFRNAA: 92 , resulted on 2022          Passed - GFR in the past 12 months             Future Appointments         Provider Department Appt Notes    In 2 months Johann Gongora MD [address] 6 mo f/u            Recent Outpatient Visits              3 months ago Chronic ischemic heart disease    [address], [address], MD Jv    Office Visit    3 months ago Diabetes mellitus type 2 without retinopathy [facility])    [facility] Yakelin Gauthier MD    Office Visit    5 months ago Screen for colon cancer    [facility] GI PROCEDURE    Nurse Only    9 months ago Type 2 diabetes mellitus without complication, without long-term current use of insulin [facility])    [facility], [address] [facility]    Office Visit    5 years ago Immunization due    Serina Mena., MD    Office Visit

## 2022-11-04 NOTE — TELEPHONE ENCOUNTER
This is the second attempt to reach this pt in regards to scheduling with NO success. Letter sent. TE closed.

## 2022-11-07 RX ORDER — SIMVASTATIN 20 MG
20 TABLET ORAL NIGHTLY
Qty: 90 TABLET | Refills: 1 | Status: SHIPPED | OUTPATIENT
Start: 2022-11-07

## 2022-12-05 ENCOUNTER — LAB ENCOUNTER (OUTPATIENT)
Dept: LAB | Age: 67
End: 2022-12-05
Attending: INTERNAL MEDICINE
Payer: MEDICARE

## 2022-12-05 DIAGNOSIS — E11.9 DIABETES MELLITUS (HCC): ICD-10-CM

## 2022-12-05 DIAGNOSIS — I49.3 PVC (PREMATURE VENTRICULAR CONTRACTION): ICD-10-CM

## 2022-12-05 DIAGNOSIS — R94.39 ABNORMAL STRESS ECHO: ICD-10-CM

## 2022-12-05 DIAGNOSIS — E78.5 HYPERLIPEMIA: ICD-10-CM

## 2022-12-05 DIAGNOSIS — I25.10 CAD (CORONARY ARTERY DISEASE): ICD-10-CM

## 2022-12-05 DIAGNOSIS — I10 HTN (HYPERTENSION): Primary | ICD-10-CM

## 2022-12-05 LAB
ALBUMIN SERPL-MCNC: 3.9 G/DL (ref 3.4–5)
ALBUMIN/GLOB SERPL: 1.2 {RATIO} (ref 1–2)
ALP LIVER SERPL-CCNC: 59 U/L
ALT SERPL-CCNC: 40 U/L
ANION GAP SERPL CALC-SCNC: 6 MMOL/L (ref 0–18)
AST SERPL-CCNC: 21 U/L (ref 15–37)
BASOPHILS # BLD AUTO: 0.03 X10(3) UL (ref 0–0.2)
BASOPHILS NFR BLD AUTO: 0.5 %
BILIRUB SERPL-MCNC: 0.8 MG/DL (ref 0.1–2)
BUN BLD-MCNC: 11 MG/DL (ref 7–18)
BUN/CREAT SERPL: 13.4 (ref 10–20)
CALCIUM BLD-MCNC: 8.7 MG/DL (ref 8.5–10.1)
CHLORIDE SERPL-SCNC: 101 MMOL/L (ref 98–112)
CHOLEST SERPL-MCNC: 129 MG/DL (ref ?–200)
CO2 SERPL-SCNC: 30 MMOL/L (ref 21–32)
CREAT BLD-MCNC: 0.82 MG/DL
DEPRECATED RDW RBC AUTO: 42 FL (ref 35.1–46.3)
EOSINOPHIL # BLD AUTO: 0.18 X10(3) UL (ref 0–0.7)
EOSINOPHIL NFR BLD AUTO: 3 %
ERYTHROCYTE [DISTWIDTH] IN BLOOD BY AUTOMATED COUNT: 12.4 % (ref 11–15)
FASTING PATIENT LIPID ANSWER: YES
FASTING STATUS PATIENT QL REPORTED: YES
GFR SERPLBLD BASED ON 1.73 SQ M-ARVRAT: 97 ML/MIN/1.73M2 (ref 60–?)
GLOBULIN PLAS-MCNC: 3.3 G/DL (ref 2.8–4.4)
GLUCOSE BLD-MCNC: 117 MG/DL (ref 70–99)
HCT VFR BLD AUTO: 40.9 %
HDLC SERPL-MCNC: 46 MG/DL (ref 40–59)
HGB BLD-MCNC: 13.4 G/DL
IMM GRANULOCYTES # BLD AUTO: 0.02 X10(3) UL (ref 0–1)
IMM GRANULOCYTES NFR BLD: 0.3 %
LDLC SERPL CALC-MCNC: 58 MG/DL (ref ?–100)
LYMPHOCYTES # BLD AUTO: 2.16 X10(3) UL (ref 1–4)
LYMPHOCYTES NFR BLD AUTO: 36.4 %
MCH RBC QN AUTO: 30.2 PG (ref 26–34)
MCHC RBC AUTO-ENTMCNC: 32.8 G/DL (ref 31–37)
MCV RBC AUTO: 92.3 FL
MONOCYTES # BLD AUTO: 0.61 X10(3) UL (ref 0.1–1)
MONOCYTES NFR BLD AUTO: 10.3 %
NEUTROPHILS # BLD AUTO: 2.93 X10 (3) UL (ref 1.5–7.7)
NEUTROPHILS # BLD AUTO: 2.93 X10(3) UL (ref 1.5–7.7)
NEUTROPHILS NFR BLD AUTO: 49.5 %
NONHDLC SERPL-MCNC: 83 MG/DL (ref ?–130)
OSMOLALITY SERPL CALC.SUM OF ELEC: 284 MOSM/KG (ref 275–295)
PLATELET # BLD AUTO: 196 10(3)UL (ref 150–450)
POTASSIUM SERPL-SCNC: 4.2 MMOL/L (ref 3.5–5.1)
PROT SERPL-MCNC: 7.2 G/DL (ref 6.4–8.2)
RBC # BLD AUTO: 4.43 X10(6)UL
SODIUM SERPL-SCNC: 137 MMOL/L (ref 136–145)
TRIGL SERPL-MCNC: 144 MG/DL (ref 30–149)
VLDLC SERPL CALC-MCNC: 21 MG/DL (ref 0–30)
WBC # BLD AUTO: 5.9 X10(3) UL (ref 4–11)

## 2022-12-05 PROCEDURE — 36415 COLL VENOUS BLD VENIPUNCTURE: CPT

## 2022-12-05 PROCEDURE — 80053 COMPREHEN METABOLIC PANEL: CPT

## 2022-12-05 PROCEDURE — 85025 COMPLETE CBC W/AUTO DIFF WBC: CPT

## 2022-12-05 PROCEDURE — 80061 LIPID PANEL: CPT

## 2022-12-09 ENCOUNTER — MED REC SCAN ONLY (OUTPATIENT)
Dept: INTERNAL MEDICINE CLINIC | Facility: CLINIC | Age: 67
End: 2022-12-09

## 2023-01-14 ENCOUNTER — LAB ENCOUNTER (OUTPATIENT)
Dept: LAB | Age: 68
End: 2023-01-14
Attending: INTERNAL MEDICINE
Payer: MEDICARE

## 2023-01-14 DIAGNOSIS — I10 ESSENTIAL HYPERTENSION: ICD-10-CM

## 2023-01-14 DIAGNOSIS — Z12.5 SCREENING PSA (PROSTATE SPECIFIC ANTIGEN): ICD-10-CM

## 2023-01-14 DIAGNOSIS — E11.9 DIABETES MELLITUS TYPE 2 WITHOUT RETINOPATHY (HCC): ICD-10-CM

## 2023-01-14 LAB
ALBUMIN SERPL-MCNC: 3.8 G/DL (ref 3.4–5)
ALBUMIN/GLOB SERPL: 1.1 {RATIO} (ref 1–2)
ALP LIVER SERPL-CCNC: 53 U/L
ALT SERPL-CCNC: 35 U/L
ANION GAP SERPL CALC-SCNC: 4 MMOL/L (ref 0–18)
AST SERPL-CCNC: 25 U/L (ref 15–37)
BILIRUB SERPL-MCNC: 0.8 MG/DL (ref 0.1–2)
BUN BLD-MCNC: 9 MG/DL (ref 7–18)
BUN/CREAT SERPL: 11.3 (ref 10–20)
CALCIUM BLD-MCNC: 8.8 MG/DL (ref 8.5–10.1)
CHLORIDE SERPL-SCNC: 103 MMOL/L (ref 98–112)
CHOLEST SERPL-MCNC: 112 MG/DL (ref ?–200)
CO2 SERPL-SCNC: 28 MMOL/L (ref 21–32)
COMPLEXED PSA SERPL-MCNC: 1.03 NG/ML (ref ?–4)
CREAT BLD-MCNC: 0.8 MG/DL
CREAT UR-SCNC: 51.1 MG/DL
DEPRECATED RDW RBC AUTO: 41.7 FL (ref 35.1–46.3)
ERYTHROCYTE [DISTWIDTH] IN BLOOD BY AUTOMATED COUNT: 12.1 % (ref 11–15)
EST. AVERAGE GLUCOSE BLD GHB EST-MCNC: 140 MG/DL (ref 68–126)
FASTING PATIENT LIPID ANSWER: YES
FASTING STATUS PATIENT QL REPORTED: YES
GFR SERPLBLD BASED ON 1.73 SQ M-ARVRAT: 98 ML/MIN/1.73M2 (ref 60–?)
GLOBULIN PLAS-MCNC: 3.6 G/DL (ref 2.8–4.4)
GLUCOSE BLD-MCNC: 109 MG/DL (ref 70–99)
HBA1C MFR BLD: 6.5 % (ref ?–5.7)
HCT VFR BLD AUTO: 39.5 %
HDLC SERPL-MCNC: 40 MG/DL (ref 40–59)
HGB BLD-MCNC: 12.7 G/DL
LDLC SERPL CALC-MCNC: 51 MG/DL (ref ?–100)
MCH RBC QN AUTO: 30.2 PG (ref 26–34)
MCHC RBC AUTO-ENTMCNC: 32.2 G/DL (ref 31–37)
MCV RBC AUTO: 93.8 FL
MICROALBUMIN UR-MCNC: 0.96 MG/DL
MICROALBUMIN/CREAT 24H UR-RTO: 18.8 UG/MG (ref ?–30)
NONHDLC SERPL-MCNC: 72 MG/DL (ref ?–130)
OSMOLALITY SERPL CALC.SUM OF ELEC: 279 MOSM/KG (ref 275–295)
PLATELET # BLD AUTO: 230 10(3)UL (ref 150–450)
POTASSIUM SERPL-SCNC: 3.6 MMOL/L (ref 3.5–5.1)
PROT SERPL-MCNC: 7.4 G/DL (ref 6.4–8.2)
RBC # BLD AUTO: 4.21 X10(6)UL
SODIUM SERPL-SCNC: 135 MMOL/L (ref 136–145)
T4 FREE SERPL-MCNC: 1 NG/DL (ref 0.8–1.7)
TRIGL SERPL-MCNC: 118 MG/DL (ref 30–149)
TSI SER-ACNC: 4.29 MIU/ML (ref 0.36–3.74)
VLDLC SERPL CALC-MCNC: 17 MG/DL (ref 0–30)
WBC # BLD AUTO: 5.5 X10(3) UL (ref 4–11)

## 2023-01-14 PROCEDURE — 84443 ASSAY THYROID STIM HORMONE: CPT

## 2023-01-14 PROCEDURE — 83036 HEMOGLOBIN GLYCOSYLATED A1C: CPT

## 2023-01-14 PROCEDURE — 3061F NEG MICROALBUMINURIA REV: CPT | Performed by: INTERNAL MEDICINE

## 2023-01-14 PROCEDURE — 82043 UR ALBUMIN QUANTITATIVE: CPT

## 2023-01-14 PROCEDURE — 80053 COMPREHEN METABOLIC PANEL: CPT

## 2023-01-14 PROCEDURE — 85027 COMPLETE CBC AUTOMATED: CPT

## 2023-01-14 PROCEDURE — 84439 ASSAY OF FREE THYROXINE: CPT

## 2023-01-14 PROCEDURE — 36415 COLL VENOUS BLD VENIPUNCTURE: CPT

## 2023-01-14 PROCEDURE — 80061 LIPID PANEL: CPT

## 2023-01-14 PROCEDURE — 3044F HG A1C LEVEL LT 7.0%: CPT | Performed by: INTERNAL MEDICINE

## 2023-01-14 PROCEDURE — 82570 ASSAY OF URINE CREATININE: CPT

## 2023-01-16 ENCOUNTER — OFFICE VISIT (OUTPATIENT)
Dept: INTERNAL MEDICINE CLINIC | Facility: CLINIC | Age: 68
End: 2023-01-16

## 2023-01-16 VITALS
SYSTOLIC BLOOD PRESSURE: 124 MMHG | HEART RATE: 74 BPM | HEIGHT: 68 IN | OXYGEN SATURATION: 98 % | BODY MASS INDEX: 24.55 KG/M2 | DIASTOLIC BLOOD PRESSURE: 70 MMHG | WEIGHT: 162 LBS | RESPIRATION RATE: 14 BRPM

## 2023-01-16 DIAGNOSIS — I25.9 CHRONIC ISCHEMIC HEART DISEASE: Primary | ICD-10-CM

## 2023-01-16 DIAGNOSIS — Z23 NEED FOR PNEUMOCOCCAL VACCINATION: ICD-10-CM

## 2023-01-16 DIAGNOSIS — N40.0 BENIGN PROSTATIC HYPERPLASIA WITHOUT LOWER URINARY TRACT SYMPTOMS: ICD-10-CM

## 2023-01-16 DIAGNOSIS — H25.13 AGE-RELATED NUCLEAR CATARACT OF BOTH EYES: ICD-10-CM

## 2023-01-16 DIAGNOSIS — E11.9 DIABETES MELLITUS TYPE 2 WITHOUT RETINOPATHY (HCC): ICD-10-CM

## 2023-01-16 DIAGNOSIS — I25.10 CORONARY ARTERY DISEASE INVOLVING NATIVE CORONARY ARTERY OF NATIVE HEART WITHOUT ANGINA PECTORIS: ICD-10-CM

## 2023-01-16 DIAGNOSIS — Z12.11 SCREEN FOR COLON CANCER: ICD-10-CM

## 2023-01-16 DIAGNOSIS — I10 ESSENTIAL HYPERTENSION: ICD-10-CM

## 2023-01-16 DIAGNOSIS — E78.5 HYPERLIPIDEMIA, UNSPECIFIED HYPERLIPIDEMIA TYPE: ICD-10-CM

## 2023-01-16 DIAGNOSIS — E55.9 VITAMIN D DEFICIENCY: ICD-10-CM

## 2023-01-16 RX ORDER — SIMVASTATIN 20 MG
20 TABLET ORAL NIGHTLY
Qty: 90 TABLET | Refills: 3 | Status: SHIPPED | OUTPATIENT
Start: 2023-01-16

## 2023-01-26 RX ORDER — METOPROLOL SUCCINATE 25 MG/1
25 TABLET, EXTENDED RELEASE ORAL DAILY
Qty: 90 TABLET | Refills: 1 | Status: SHIPPED | OUTPATIENT
Start: 2023-01-26

## 2023-01-26 NOTE — TELEPHONE ENCOUNTER
Refill passed per [employer] protocol. Requested Prescriptions   Pending Prescriptions Disp Refills    metoprolol succinate ER 25 MG Oral Tablet 24 Hr [Pharmacy Med Name: METOPROLOL SUCCINATE 25MG TAB] 90 tablet 1     Sig: Take 1 tablet (25 mg total) by mouth daily.        Hypertensive Medications Protocol Passed - 1/26/2023  1:48 PM        Passed - In person appointment in the past 12 or next 3 months     Recent Outpatient Visits              1 week ago Chronic ischemic heart disease    Ingrid Keenan MD    Office Visit    6 months ago Chronic ischemic heart disease    [address], [address], Ingrid Mullen MD    Office Visit    6 months ago Diabetes mellitus type 2 without retinopathy [facility])    [address], [address], [place] MD    Office Visit    8 months ago Screen for colon cancer    [address], [address], [place]    Nurse Only    1 year ago Type 2 diabetes mellitus without complication, without long-term current use of insulin ([facility] [address]    Ingrid Keenan MD    Office Visit                      Passed - Last BP reading less than 140/90     BP Readings from Last 1 Encounters:  01/16/23 : 124/70              Passed - CMP or BMP in past 6 months     Recent Results (from the past 4392 hour(s))   COMP METABOLIC PANEL (14)    Collection Time: 01/14/23  9:59 AM   Result Value Ref Range    Glucose 109 (H) 70 - 99 mg/dL    Sodium 135 (L) 136 - 145 mmol/L    Potassium 3.6 3.5 - 5.1 mmol/L    Chloride 103 98 - 112 mmol/L    CO2 28.0 21.0 - 32.0 mmol/L    Anion Gap 4 0 - 18 mmol/L    BUN 9 7 - 18 mg/dL    Creatinine 0.80 0.70 - 1.30 mg/dL    BUN/CREA Ratio 11.3 10.0 - 20.0    Calcium, Total 8.8 8.5 - 10.1 mg/dL    Calculated Osmolality 279 275 - 295 mOsm/kg    eGFR-Cr 98 >=60 mL/min/1.73m2    ALT 35 16 - 61 U/L AST 25 15 - 37 U/L    Alkaline Phosphatase 53 45 - 117 U/L    Bilirubin, Total 0.8 0.1 - 2.0 mg/dL    Total Protein 7.4 6.4 - 8.2 g/dL    Albumin 3.8 3.4 - 5.0 g/dL    Globulin  3.6 2.8 - 4.4 g/dL    A/G Ratio 1.1 1.0 - 2.0    Patient Fasting for CMP? Yes      *Note: Due to a large number of results and/or encounters for the requested time period, some results have not been displayed. A complete set of results can be found in Results Review.                Passed - In person appointment or virtual visit in the past 6 months     Recent Outpatient Visits              1 week ago Chronic ischemic heart disease    Celestine Vela MD    Office Visit    6 months ago Chronic ischemic heart disease    [facility], [address], Celestine Hilton MD    Office Visit    6 months ago Diabetes mellitus type 2 without retinopathy [facility])    [facility]atherine Sal, [address], Mel Daley MD    Office Visit    8 months ago Screen for colon cancer    [facility], [address], [place]    Nurse Only    1 year ago Type 2 diabetes mellitus without complication, without long-term current use of insulin ([facility] [address]    Celestine Vela MD    Office Visit                      Passed - EGFRCR or GFRNAA > 50     GFR Evaluation  EGFRCR: 98 , resulted on 1/14/2023                Recent Outpatient Visits              1 week ago Chronic ischemic heart disease    Celestine Vela MD    Office Visit    6 months ago Chronic ischemic heart disease    [address] Celestine Martinez MD    Office Visit    6 months ago Diabetes mellitus type 2 without retinopathy [facility])    [address], Tanner Rod MD    Office Visit    8 months ago Screen for colon cancer    [facility], [address], [address]    Nurse Only    1 year ago Type 2 diabetes mellitus without complication, without long-term current use of insulin (HCC)    Gonzalez Rodriguez MD    Office Visit

## 2023-01-31 RX ORDER — SIMVASTATIN 20 MG
20 TABLET ORAL NIGHTLY
Qty: 90 TABLET | Refills: 3 | Status: SHIPPED | OUTPATIENT
Start: 2023-01-31

## 2023-01-31 RX ORDER — SIMVASTATIN 20 MG
20 TABLET ORAL NIGHTLY
Qty: 90 TABLET | Refills: 3 | Status: CANCELLED | OUTPATIENT
Start: 2023-01-31

## 2023-01-31 NOTE — TELEPHONE ENCOUNTER
Simvastatin was sent to [facility] on 01/16/2023 for 90 tabs with 3 refills. [address] was called to cancel script there. Patient has not picked up script there. Medication sent to [facility] as requested.

## 2023-03-31 RX ORDER — SIMVASTATIN 20 MG
20 TABLET ORAL NIGHTLY
Qty: 90 TABLET | Refills: 3 | Status: SHIPPED | OUTPATIENT
Start: 2023-03-31

## 2023-03-31 RX ORDER — METOPROLOL SUCCINATE 25 MG/1
25 TABLET, EXTENDED RELEASE ORAL DAILY
Qty: 90 TABLET | Refills: 3 | Status: SHIPPED | OUTPATIENT
Start: 2023-03-31

## 2023-03-31 NOTE — TELEPHONE ENCOUNTER
Refill passed per [address] protocol.     Requested Prescriptions   Pending Prescriptions Disp Refills    METOPROLOL SUCCINATE ER 25 MG Oral Tablet 24 Hr [Pharmacy Med Name: METOPROLOL SUCCINATE 25MG TAB] 90 tablet 0     Si TAB BY MOUTH EVERY DAY       Hypertensive Medications Protocol Passed - 3/30/2023 12:41 PM        Passed - In person appointment in the past 12 or next 3 months     Recent Outpatient Visits              2 months ago Chronic ischemic heart disease    [address] [facility], Daphine Holstein, MD    Office Visit    8 months ago Chronic ischemic heart disease    [facility], [address], Daphine Holstein, MD    Office Visit    8 months ago Diabetes mellitus type 2 without retinopathy [facility])    [address], [address], Lola Daley MD    Office Visit    10 months ago Screen for colon cancer    [address], [address], [place]    Nurse Only    1 year ago Type 2 diabetes mellitus without complication, without long-term current use of insulin ([facility] [address]    [address] [facility], Daphine Holstein, MD    Office Visit                      Passed - Last BP reading less than 140/90     BP Readings from Last 1 Encounters:  23 : 124/70              Passed - CMP or BMP in past 6 months     Recent Results (from the past 4392 hour(s))   COMP METABOLIC PANEL (14)    Collection Time: 23  9:59 AM   Result Value Ref Range    Glucose 109 (H) 70 - 99 mg/dL    Sodium 135 (L) 136 - 145 mmol/L    Potassium 3.6 3.5 - 5.1 mmol/L    Chloride 103 98 - 112 mmol/L    CO2 28.0 21.0 - 32.0 mmol/L    Anion Gap 4 0 - 18 mmol/L    BUN 9 7 - 18 mg/dL    Creatinine 0.80 0.70 - 1.30 mg/dL    BUN/CREA Ratio 11.3 10.0 - 20.0    Calcium, Total 8.8 8.5 - 10.1 mg/dL    Calculated Osmolality 279 275 - 295 mOsm/kg    eGFR-Cr 98 >=60 mL/min/1.73m2    ALT 35 16 - 61 U/L    AST 25 15 - 37 U/L    Alkaline Phosphatase 53 45 - 117 U/L    Bilirubin, Total 0.8 0.1 - 2.0 mg/dL    Total Protein 7.4 6.4 - 8.2 g/dL    Albumin 3.8 3.4 - 5.0 g/dL    Globulin  3.6 2.8 - 4.4 g/dL    A/G Ratio 1.1 1.0 - 2.0    Patient Fasting for CMP? Yes      *Note: Due to a large number of results and/or encounters for the requested time period, some results have not been displayed. A complete set of results can be found in Results Review. Passed - In person appointment or virtual visit in the past 6 months     Recent Outpatient Visits              2 months ago Chronic ischemic heart disease    [address] [facility] [address], Daphine Holstein, MD    Office Visit    8 months ago Chronic ischemic heart disease    [address], [address], Daphine Holstein, MD    Office Visit    8 months ago Diabetes mellitus type 2 without retinopathy [facility])    [address], [address], Tommie Kellogg MD    Office Visit    10 months ago Screen for colon cancer    [address], [address], [place]    Nurse Only    1 year ago Type 2 diabetes mellitus without complication, without long-term current use of insulin ([facility]    [address] [facility] [address], Daphine Holstein, MD    Office Visit                      Passed - EGFRCR or GFRNAA > 50     GFR Evaluation  EGFRCR: 98 , resulted on 1/14/2023            SIMVASTATIN 20 MG Oral Tab [Pharmacy Med Name: SIMVASTATIN 20MG TAB] 90 tablet 2     Sig: Take 1 tablet (20 mg total) by mouth nightly.        Cholesterol Medication Protocol Passed - 3/30/2023 12:41 PM        Passed - ALT in past 12 months        Passed - LDL in past 12 months        Passed - Last ALT < 80     Lab Results   Component Value Date    ALT 35 01/14/2023             Passed - Last LDL < 130     Lab Results   Component Value Date    LDL 51 01/14/2023             Passed - In person appointment or virtual visit in the past 12 mos or appointment in next 3 mos     Recent Outpatient Visits              2 months ago Chronic ischemic heart disease    [facility], [address] Madeline Almeida MD    Office Visit    8 months ago Chronic ischemic heart disease    [address], [address], Madeline Rangel MD    Office Visit    8 months ago Diabetes mellitus type 2 without retinopathy [facility])    [address], [address] [facility], [place] or[place]    Office Visit    10 months ago Screen for colon cancer    [address] [place] [address], [place]    Nurse Only    1 year ago Type 2 diabetes mellitus without complication, without long-term current use of insulin ([facility] [address]    Madeline Palacios MD    Office Visit                           Recent Outpatient Visits              2 months ago Chronic ischemic heart disease    Madeline Palacios MD    Office Visit    8 months ago Chronic ischemic heart disease    [address], [address], Madeline Rangel MD    Office Visit    8 months ago Diabetes mellitus type 2 without retinopathy [facility])    [address], [address] [facility], Romeo Daley MD    Office Visit    10 months ago Screen for colon cancer    [address], [address] [place] [address], [place]    Nurse Only    1 year ago Type 2 diabetes mellitus without complication, without long-term current use of insulin ([address]    Madeline Palacios MD    Office Visit

## 2023-06-01 ENCOUNTER — NURSE TRIAGE (OUTPATIENT)
Dept: INTERNAL MEDICINE CLINIC | Facility: CLINIC | Age: 68
End: 2023-06-01

## 2023-06-01 NOTE — TELEPHONE ENCOUNTER
Refill passed per [employer] protocol.   Requested Prescriptions   Pending Prescriptions Disp Refills    METFORMIN 500 MG Oral Tab [Pharmacy Med Name: Frederico Apley 500MG TAB] 270 tablet 0     Sig: TAKE ONE TABLET BY MOUTH IN THE MORNING AND TAKE TWO TABLETS BY MOUTH IN THE EVENING       Diabetes Medication Protocol Passed - 5/31/2023  4:47 PM        Passed - Last A1C < 7.5 and within past 6 months     Lab Results   Component Value Date    A1C 6.5 (H) 01/14/2023               Passed - In person appointment or virtual visit in the past 6 mos or appointment in next 3 mos     Recent Outpatient Visits              4 months ago Chronic ischemic heart disease    [facility], [address] Jv Almeida MD    Office Visit    10 months ago Chronic ischemic heart disease    [address], [address] [facility] [address] [facility] [address] [address], MD Jv    Office Visit    10 months ago Diabetes mellitus type 2 without retinopathy [facility])    [address], [address] [facility] [address], Priscilla Lim MD    Office Visit    1 year ago Screen for colon cancer    [address], [address], [place]    Nurse Only    1 year ago Type 2 diabetes mellitus without complication, without long-term current use of insulin ([address]    Jv Rodriguez MD    Office Visit                      Passed - EGFRCR or GFRNAA > 50     GFR Evaluation  EGFRCR: 98 , resulted on 1/14/2023            Passed - GFR in the past 12 months           Recent Outpatient Visits              4 months ago Chronic ischemic heart disease    Jv Rodriguez MD    Office Visit    10 months ago Chronic ischemic heart disease    [facility], MD Jv    Office Visit    10 months ago Diabetes mellitus type 2 without retinopathy ([address]    [address] Algernon Fielding, MD    Office Visit    1 year ago Screen for colon cancer    [address], [address], [facility]    Nurse Only    1 year ago Type 2 diabetes mellitus without complication, without long-term current use of insulin ([facility]    Nic Burch, Aldair Mon MD    Office Visit

## 2023-06-01 NOTE — TELEPHONE ENCOUNTER
Patient scheduled a mychart appointment noting the following on notes:    Lower back pain    6/1/23 LMTCB , mychart message sent.

## 2023-06-06 ENCOUNTER — OFFICE VISIT (OUTPATIENT)
Facility: CLINIC | Age: 68
End: 2023-06-06

## 2023-06-06 VITALS
HEIGHT: 68 IN | OXYGEN SATURATION: 100 % | BODY MASS INDEX: 24.55 KG/M2 | SYSTOLIC BLOOD PRESSURE: 110 MMHG | RESPIRATION RATE: 14 BRPM | WEIGHT: 162 LBS | HEART RATE: 64 BPM | DIASTOLIC BLOOD PRESSURE: 70 MMHG

## 2023-06-06 DIAGNOSIS — I25.10 CORONARY ARTERY DISEASE INVOLVING NATIVE CORONARY ARTERY OF NATIVE HEART WITHOUT ANGINA PECTORIS: ICD-10-CM

## 2023-06-06 DIAGNOSIS — E78.5 HYPERLIPIDEMIA, UNSPECIFIED HYPERLIPIDEMIA TYPE: ICD-10-CM

## 2023-06-06 DIAGNOSIS — E11.9 DIABETES MELLITUS TYPE 2 WITHOUT RETINOPATHY (HCC): ICD-10-CM

## 2023-06-06 DIAGNOSIS — I10 ESSENTIAL HYPERTENSION: Primary | ICD-10-CM

## 2023-06-06 DIAGNOSIS — Z12.11 SCREEN FOR COLON CANCER: ICD-10-CM

## 2023-06-06 DIAGNOSIS — M54.50 LOW BACK PAIN WITHOUT SCIATICA, UNSPECIFIED BACK PAIN LATERALITY, UNSPECIFIED CHRONICITY: ICD-10-CM

## 2023-06-06 PROCEDURE — 1159F MED LIST DOCD IN RCRD: CPT | Performed by: INTERNAL MEDICINE

## 2023-06-06 PROCEDURE — 99214 OFFICE O/P EST MOD 30 MIN: CPT | Performed by: INTERNAL MEDICINE

## 2023-06-06 PROCEDURE — 1125F AMNT PAIN NOTED PAIN PRSNT: CPT | Performed by: INTERNAL MEDICINE

## 2023-06-06 PROCEDURE — 3074F SYST BP LT 130 MM HG: CPT | Performed by: INTERNAL MEDICINE

## 2023-06-06 PROCEDURE — 3078F DIAST BP <80 MM HG: CPT | Performed by: INTERNAL MEDICINE

## 2023-06-06 PROCEDURE — 3008F BODY MASS INDEX DOCD: CPT | Performed by: INTERNAL MEDICINE

## 2023-06-06 RX ORDER — AMMONIUM LACTATE 12 G/100G
CREAM TOPICAL
Qty: 140 G | Refills: 0 | Status: SHIPPED | OUTPATIENT
Start: 2023-06-06

## 2023-06-08 ENCOUNTER — HOSPITAL ENCOUNTER (OUTPATIENT)
Dept: GENERAL RADIOLOGY | Age: 68
Discharge: HOME | End: 2023-06-08
Attending: INTERNAL MEDICINE
Payer: MEDICARE

## 2023-06-08 ENCOUNTER — LAB ENCOUNTER (OUTPATIENT)
Dept: LAB | Age: 68
End: 2023-06-08
Attending: INTERNAL MEDICINE
Payer: MEDICARE

## 2023-06-08 DIAGNOSIS — M54.50 LOW BACK PAIN WITHOUT SCIATICA, UNSPECIFIED BACK PAIN LATERALITY, UNSPECIFIED CHRONICITY: ICD-10-CM

## 2023-06-08 LAB — HEMOCCULT STL QL: NEGATIVE

## 2023-06-08 PROCEDURE — 72110 X-RAY EXAM L-2 SPINE 4/>VWS: CPT | Performed by: INTERNAL MEDICINE

## 2023-08-08 RX ORDER — AMMONIUM LACTATE 12 G/100G
CREAM TOPICAL
Qty: 385 G | Refills: 3 | Status: SHIPPED | OUTPATIENT
Start: 2023-08-08

## 2023-08-09 NOTE — TELEPHONE ENCOUNTER
Please review; protocol failed. No active /future labs noted     Requested Prescriptions   Pending Prescriptions Disp Refills    METOPROLOL SUCCINATE ER 25 MG Oral Tablet 24 Hr [Pharmacy Med Name: METOPROLOL SUCCINATE 25MG TAB] 90 tablet 0     Si TAB BY MOUTH EVERY DAY       Hypertensive Medications Protocol Failed - 2023 10:03 AM        Failed - CMP or BMP in past 6 months     No results found for this or any previous visit (from the past 4392 hour(s)).             Passed - In person appointment in the past 12 or next 3 months     Recent Outpatient Visits              2 months ago Essential hypertension    [facility], [address] [facility], MD Jv    Office Visit    6 months ago Chronic ischemic heart disease    Jv Hurtado MD    Office Visit    1 year ago Chronic ischemic heart disease    [address], [address], MD Jv    Office Visit    1 year ago Diabetes mellitus type 2 without retinopathy [facility])    [address], [address], Ivis Mckeon MD    Office Visit    1 year ago Screen for colon cancer    [address], [address], [facility]    Nurse Only          Future Appointments         Provider Department Appt Notes    In 2 months To Daniels MD [address], [address] [facility] f/u    In 4 months Iman Morfin MD [facility], [address], [place] ep dm ee, appt sched by pt's daughter, need ref, policy informed               Passed - Last BP reading less than 140/90     BP Readings from Last 1 Encounters:  / : 110/70              Passed - In person appointment or virtual visit in the past 6 months     Recent Outpatient Visits              2 months ago Essential hypertension    Jv Loco MD    Office Visit    6 months ago Chronic ischemic heart disease    Sonya Curiel MD    Office Visit    1 year ago Chronic ischemic heart disease    [address], [address], Sonya Sutherland MD    Office Visit    1 year ago Diabetes mellitus type 2 without retinopathy [facility])    [address], [address], [place], Elson Cabot, MD    Office Visit    1 year ago Screen for colon cancer    [address], [address], [place]    Nurse Only          Future Appointments         Provider Department Appt Notes    In 2 months Jen Patel MD [address], Denise Posadas, [place] f/u    In 4 months Antwan Handy MD [address], [address], [place] ep dm ee, appt sched by pt's daughter, need ref, policy informed               Passed - EGFRCR or GFRNAA > 50     GFR Evaluation  EGFRCR: 98 , resulted on 1/14/2023             Recent Outpatient Visits              2 months ago Essential hypertension    Sonya Coleman MD    Office Visit    6 months ago Chronic ischemic heart disease    Sonya Curiel MD    Office Visit    1 year ago Chronic ischemic heart disease    [address], [address], Sonya Suthreland MD    Office Visit    1 year ago Diabetes mellitus type 2 without retinopathy [facility])    [address], [address], Alyssa Davidson MD    Office Visit    1 year ago Screen for colon cancer    [address], [address], [place]    Nurse Only          Future Appointments         Provider Department Appt Notes    In 2 months Jen Patel MD [address], Denise Posadas, [place] f/u    In 4 months Antwan Handy MD [address], [address], Sean woods dm ee, appt sched by pt's daughter, need ref, policy informed

## 2023-08-09 NOTE — TELEPHONE ENCOUNTER
Please review refill protocol failed/ no protocol  Requested Prescriptions   Pending Prescriptions Disp Refills    AMMONIUM LACTATE 12 % External Cream [Pharmacy Med Name: AMMONIUM LACTATE 12% CRM] 385 g 0     Sig: APPLY DAILY 2 TIMES TO AFFECTED AREA       There is no refill protocol information for this order

## 2023-08-10 RX ORDER — METOPROLOL SUCCINATE 25 MG/1
25 TABLET, EXTENDED RELEASE ORAL DAILY
Qty: 90 TABLET | Refills: 0 | Status: SHIPPED | OUTPATIENT
Start: 2023-08-10

## 2023-09-22 RX ORDER — METOPROLOL SUCCINATE 25 MG/1
25 TABLET, EXTENDED RELEASE ORAL DAILY
Qty: 90 TABLET | Refills: 3 | Status: SHIPPED | OUTPATIENT
Start: 2023-09-22

## 2023-09-22 NOTE — TELEPHONE ENCOUNTER
Please review; protocol failed/no protocol. Requested Prescriptions   Pending Prescriptions Disp Refills    METOPROLOL SUCCINATE ER 25 MG Oral Tablet 24 Hr [Pharmacy Med Name: METOPROLOL SUCCINATE 25MG TAB] 90 tablet 0     Si TAB BY MOUTH EVERY DAY       Hypertensive Medications Protocol Failed - 2023  9:41 AM        Failed - CMP or BMP in past 6 months     No results found for this or any previous visit (from the past 4392 hour(s)).             Passed - In person appointment in the past 12 or next 3 months     Recent Outpatient Visits              3 months ago Essential hypertension    [facility], [address] [place] [address], MD Jv    Office Visit    8 months ago Chronic ischemic heart disease    Jv Grissom MD    Office Visit    1 year ago Chronic ischemic heart disease    Sofia Arriaga, [address] [facility],[address], MD Jv    Office Visit    1 year ago Diabetes mellitus type 2 without retinopathy [facility])    Sofia Arriaga, [address] [facility] [address], [place], Timmy Burton MD    Office Visit    1 year ago Screen for colon cancer    Sofia Arriaga, [address], [address]    Nurse Only          Future Appointments         Provider Department Appt Notes    In 2 weeks MD Sofia Ballard, [address] [facility], [address] [facility] SCHEDULE  SUPER VISIT   f/u    In 3 months MD Sofia Gaelas, [address] [facility],[address], [place] ep dm ee, appt sched by pt's daughter, need ref, policy informed                    Passed - Last BP reading less than 140/90     BP Readings from Last 1 Encounters:  / : 110/70              Passed - In person appointment or virtual visit in the past 6 months     Recent Outpatient Visits              3 months ago Essential hypertension    Sofia Arriaga, 602 [facility], Donna Ambrose MD    Office Visit    8 months ago Chronic ischemic heart disease    Laly Lester MD    Office Visit    1 year ago Chronic ischemic heart disease    Oanh Partida, [address], Laly Murillo MD    Office Visit    1 year ago Diabetes mellitus type 2 without retinopathy [facility])    Oanh Alvaradoing, [address], [place], Radha Landry MD    Office Visit    1 year ago Screen for colon cancer    Oanh Alvaradoing, [address], [address]    Nurse Only          Future Appointments         Provider Department Appt Notes    In 2 weeks MD Oanh Friend, [address] [facility], [address] [facility] SCHEDULE 2024 SUPER VISIT   f/u    In 3 months MD Oanh Mayo, [address], [place] ep dm ee, appt sched by pt's daughter, need ref, policy informed                    Passed - EGFRCR or GFRNAA > 50     GFR Evaluation  EGFRCR: 98 , resulted on 1/14/2023                Recent Outpatient Visits              3 months ago Essential hypertension    [facility], [address] [facility], Laly Murillo MD    Office Visit    8 months ago Chronic ischemic heart disease    Laly Lester MD    Office Visit    1 year ago Chronic ischemic heart disease    Oanh Partida, [address], Laly Murillo MD    Office Visit    1 year ago Diabetes mellitus type 2 without retinopathy [facility])    Oanh Partida, [address], [place] Cassandra Arnett MD    Office Visit    1 year ago Screen for colon cancer    Oanh Partida, [address], [place]    Nurse Only             Future Appointments         Provider Department Appt Notes    In 2 weeks MD Oanh Friend, [address] [place], [address] [facility] SCHEDULE 2024 Caprice Simmonds VISIT   f/u    In 3 months [facility], [address], [place] ep dm ee, appt sched by pt's daughter, need ref, policy informed

## 2023-10-09 ENCOUNTER — OFFICE VISIT (OUTPATIENT)
Dept: INTERNAL MEDICINE CLINIC | Facility: CLINIC | Age: 68
End: 2023-10-09

## 2023-10-09 VITALS
HEART RATE: 70 BPM | BODY MASS INDEX: 25.76 KG/M2 | RESPIRATION RATE: 14 BRPM | DIASTOLIC BLOOD PRESSURE: 72 MMHG | HEIGHT: 68 IN | WEIGHT: 170 LBS | SYSTOLIC BLOOD PRESSURE: 120 MMHG | OXYGEN SATURATION: 98 %

## 2023-10-09 DIAGNOSIS — I10 ESSENTIAL HYPERTENSION: ICD-10-CM

## 2023-10-09 DIAGNOSIS — Z23 INFLUENZA VACCINE NEEDED: ICD-10-CM

## 2023-10-09 DIAGNOSIS — Z12.5 SCREENING PSA (PROSTATE SPECIFIC ANTIGEN): ICD-10-CM

## 2023-10-09 DIAGNOSIS — E78.5 HYPERLIPIDEMIA, UNSPECIFIED HYPERLIPIDEMIA TYPE: ICD-10-CM

## 2023-10-09 DIAGNOSIS — E11.9 DIABETES MELLITUS TYPE 2 WITHOUT RETINOPATHY (HCC): Primary | ICD-10-CM

## 2023-10-09 PROCEDURE — G0008 ADMIN INFLUENZA VIRUS VAC: HCPCS | Performed by: INTERNAL MEDICINE

## 2023-10-09 PROCEDURE — 3074F SYST BP LT 130 MM HG: CPT | Performed by: INTERNAL MEDICINE

## 2023-10-09 PROCEDURE — 1126F AMNT PAIN NOTED NONE PRSNT: CPT | Performed by: INTERNAL MEDICINE

## 2023-10-09 PROCEDURE — 1159F MED LIST DOCD IN RCRD: CPT | Performed by: INTERNAL MEDICINE

## 2023-10-09 PROCEDURE — 3008F BODY MASS INDEX DOCD: CPT | Performed by: INTERNAL MEDICINE

## 2023-10-09 PROCEDURE — 99214 OFFICE O/P EST MOD 30 MIN: CPT | Performed by: INTERNAL MEDICINE

## 2023-10-09 PROCEDURE — 3078F DIAST BP <80 MM HG: CPT | Performed by: INTERNAL MEDICINE

## 2023-10-09 PROCEDURE — 90662 IIV NO PRSV INCREASED AG IM: CPT | Performed by: INTERNAL MEDICINE

## 2023-10-09 RX ORDER — LISINOPRIL 5 MG/1
5 TABLET ORAL DAILY
Qty: 90 TABLET | Refills: 1 | Status: SHIPPED | OUTPATIENT
Start: 2023-10-09

## 2023-10-09 RX ORDER — METOPROLOL SUCCINATE 25 MG/1
25 TABLET, EXTENDED RELEASE ORAL DAILY
Qty: 90 TABLET | Refills: 3 | Status: SHIPPED | OUTPATIENT
Start: 2023-10-09

## 2023-10-27 RX ORDER — AMMONIUM LACTATE 12 G/100G
CREAM TOPICAL
Qty: 385 G | Refills: 2 | Status: SHIPPED | OUTPATIENT
Start: 2023-10-27

## 2023-10-27 NOTE — TELEPHONE ENCOUNTER
Please review; protocol failed.    Requested Prescriptions   Pending Prescriptions Disp Refills    AMMONIUM LACTATE 12 % External Cream [Pharmacy Med Name: AMMONIUM LACTATE 12% CRM] 385 g 2     Sig: APPLY DAILY DAILY 2 TIMES TO AFFECTED AREA       There is no refill protocol information for this order        Recent Outpatient Visits              2 weeks ago Diabetes mellitus type 2 without retinopathy ([facility] [address]    Ingrid Keenan MD    Office Visit    4 months ago Essential hypertension    [facility], Ingrid Mullen MD    Office Visit    9 months ago Chronic ischemic heart disease    Ingrid Keenan MD    Office Visit    1 year ago Chronic ischemic heart disease    Giancarlo Cifuentes, [address], Ingrid Mullen MD    Office Visit    1 year ago Diabetes mellitus type 2 without retinopathy [facility])    Giancarlo Cifuentes, [address], Ja Workman MD    Office Visit          Future Appointments         Provider Department Appt Notes    In 2 months Alanna Negrete MD [address] [place] [address], [place] ep dm ee, appt sched by pt's daughter, need ref, policy informed    In 3 months MD Giancarlo Delong, [address] [facility]

## 2023-11-30 RX ORDER — SIMVASTATIN 20 MG
20 TABLET ORAL NIGHTLY
Qty: 90 TABLET | Refills: 2 | OUTPATIENT
Start: 2023-11-30

## 2023-11-30 RX ORDER — METOPROLOL SUCCINATE 25 MG/1
25 TABLET, EXTENDED RELEASE ORAL DAILY
Qty: 90 TABLET | Refills: 2 | OUTPATIENT
Start: 2023-11-30

## 2023-11-30 RX ORDER — METOPROLOL SUCCINATE 25 MG/1
25 TABLET, EXTENDED RELEASE ORAL DAILY
Qty: 90 TABLET | Refills: 0 | OUTPATIENT
Start: 2023-11-30

## 2023-12-21 ENCOUNTER — TELEPHONE (OUTPATIENT)
Dept: INTERNAL MEDICINE CLINIC | Facility: CLINIC | Age: 68
End: 2023-12-21

## 2023-12-21 ENCOUNTER — MED REC SCAN ONLY (OUTPATIENT)
Dept: INTERNAL MEDICINE CLINIC | Facility: CLINIC | Age: 68
End: 2023-12-21

## 2023-12-29 RX ORDER — AMMONIUM LACTATE 12 G/100G
CREAM TOPICAL
Qty: 385 G | Refills: 1 | Status: SHIPPED | OUTPATIENT
Start: 2023-12-29

## 2023-12-29 RX ORDER — LISINOPRIL 5 MG/1
5 TABLET ORAL DAILY
Qty: 90 TABLET | Refills: 0 | Status: SHIPPED | OUTPATIENT
Start: 2023-12-29

## 2023-12-29 RX ORDER — SIMVASTATIN 20 MG
20 TABLET ORAL NIGHTLY
Qty: 90 TABLET | Refills: 3 | Status: SHIPPED | OUTPATIENT
Start: 2023-12-29

## 2023-12-29 RX ORDER — METOPROLOL SUCCINATE 25 MG/1
25 TABLET, EXTENDED RELEASE ORAL DAILY
Qty: 90 TABLET | Refills: 2 | Status: SHIPPED | OUTPATIENT
Start: 2023-12-29

## 2023-12-29 NOTE — TELEPHONE ENCOUNTER
Refill passed per [employer], [facility] protocol.   Requested Prescriptions   Pending Prescriptions Disp Refills    AMMONIUM LACTATE 12 % External Cream [Pharmacy Med Name: AMMONIUM LACTATE 12% CRM] 385 g 1     Sig: APPLY DAILY DAILY DAILY 2 TIMES TO AFFECTED AREA       There is no refill protocol information for this order       METFORMIN 500 MG Oral Tab [Pharmacy Med Name: METFORMIN HCL 500MG TAB] 270 tablet 2     Sig: TAKE ONE TABLET BY MOUTH IN THE MORNING AND TAKE TWO TABLETS BY MOUTH IN THE EVENING       Diabetes Medication Protocol Failed - 12/28/2023 12:28 PM        Failed - Last A1C < 7.5 and within past 6 months     Lab Results   Component Value Date    A1C 6.5 (H) 01/14/2023             Passed - In person appointment or virtual visit in the past 6 mos or appointment in next 3 mos     Recent Outpatient Visits              2 months ago Diabetes mellitus type 2 without retinopathy [facility]    Jennifer Avelar MD    Office Visit    6 months ago Essential hypertension    [address], [address] [facility], Jennifer Fleming MD    Office Visit    11 months ago Chronic ischemic heart disease    Jennifer Avelar MD    Office Visit    1 year ago Chronic ischemic heart disease    [address], [address] [facility],[address], Jennifer Fleming MD    Office Visit    1 year ago Diabetes mellitus type 2 without retinopathy [facility])    [address], [address] [facility],[address], Faisal Nguyen MD    Office Visit          Future Appointments         Provider Department Appt Notes    In 1 month Hettie Kanner, MD [address], [address] [facility] annual               Passed - [facility] > 50     GFR Evaluation  EGFRCR: 98 , resulted on 1/14/2023          Passed - GFR in the past 12 months          LISINOPRIL 5 MG Oral Tab [Pharmacy Med Name: LISINOPRIL 5MG TAB] 90 tablet 0     Sig: Take 1 tablet (5 mg total) by mouth daily. Hypertensive Medications Protocol Failed - 12/28/2023 12:28 PM        Failed - CMP or BMP in past 6 months     No results found for this or any previous visit (from the past 4392 hour(s)).             Passed - In person appointment in the past 12 or next 3 months     Recent Outpatient Visits              2 months ago Diabetes mellitus type 2 without retinopathy ([facility]    Julio Pierre MD    Office Visit    6 months ago Essential hypertension    Julio Robbins MD    Office Visit    11 months ago Chronic ischemic heart disease    Julio Pierre MD    Office Visit    1 year ago Chronic ischemic heart disease    Portia Locke, [address], Julio Benito MD    Office Visit    1 year ago Diabetes mellitus type 2 without retinopathy [facility])    Portia Locke, [address], Cele Mcleod MD    Office Visit          Future Appointments         Provider Department Appt Notes    In 1 month Александр [facility]anna MD [facility], [place] annual               Passed - Last BP reading less than 140/90     BP Readings from Last 1 Encounters:   10/09/23 120/72               Passed - In person appointment or virtual visit in the past 6 months     Recent Outpatient Visits              2 months ago Diabetes mellitus type 2 without retinopathy [facility])    Julio Pierre MD    Office Visit    6 months ago Essential hypertension    Julio Robbins MD    Office Visit    11 months ago Chronic ischemic heart disease    Julio Pierer MD Office Visit    1 year ago Chronic ischemic heart disease    [facility], [address], Claudette Bennett MD    Office Visit    1 year ago Diabetes mellitus type 2 without retinopathy [facility])    [address], [address], Jovon Daley MD    Office Visit          Future Appointments         Provider Department Appt Notes    In 1 month Kurt Caldwell MD [address], [address] [facility] annual               Passed - [facility] > 50     GFR Evaluation  EGFRCR: 98 , resulted on 1/14/2023            METOPROLOL SUCCINATE ER 25 MG Oral Tablet 24 Hr [Pharmacy Med Name: METOPROLOL SUCCINATE 25MG TAB] 90 tablet 2     Sig: Take 1 tablet (25 mg total) by mouth daily. Hypertensive Medications Protocol Failed - 12/28/2023 12:28 PM        Failed - CMP or BMP in past 6 months     No results found for this or any previous visit (from the past 4392 hour(s)).             Passed - In person appointment in the past 12 or next 3 months     Recent Outpatient Visits              2 months ago Diabetes mellitus type 2 without retinopathy ([facility] [address]    Claudette Crenshaw MD    Office Visit    6 months ago Essential hypertension    Claudette Hubbard MD    Office Visit    11 months ago Chronic ischemic heart disease    Claudette Crenshaw MD    Office Visit    1 year ago Chronic ischemic heart disease    [address], [address], Claudette Bennett MD    Office Visit    1 year ago Diabetes mellitus type 2 without retinopathy [facility])    [address], [address], Seth Gilbert MD    Office Visit          Future Appointments         Provider Department Appt Notes    In 1 month Kurt Caldwell MD [address], [address] [facility] [place] annual               Passed - Last BP reading less than 140/90     BP Readings from Last 1 Encounters:   10/09/23 120/72               Passed - In person appointment or virtual visit in the past 6 months     Recent Outpatient Visits              2 months ago Diabetes mellitus type 2 without retinopathy ([facility]    [employer], [address], Laly Murillo MD    Office Visit    6 months ago Essential hypertension    Donna Ambrose, Laly Murillo MD    Office Visit    11 months ago Chronic ischemic heart disease    [facility], Laly Murillo MD    Office Visit    1 year ago Chronic ischemic heart disease    [employer], [address], Laly Murillo MD    Office Visit    1 year ago Diabetes mellitus type 2 without retinopathy [facility])    [employer], [address], Abdoul Ramirez MD    Office Visit          Future Appointments         Provider Department Appt Notes    In 1 month Prasanth Castellon MD [employer], [address] [facility], [place] annual               Passed - [facility] > 50     GFR Evaluation  EGFRCR: 98 , resulted on 1/14/2023            SIMVASTATIN 20 MG Oral Tab [Pharmacy Med Name: SIMVASTATIN 20MG TAB] 90 tablet 2     Sig: Take 1 tablet (20 mg total) by mouth nightly.        Cholesterol Medication Protocol Passed - 12/28/2023 12:28 PM        Passed - ALT in past 12 months        Passed - LDL in past 12 months        Passed - Last ALT < 80     Lab Results   Component Value Date    ALT 35 01/14/2023             Passed - Last LDL < 130     Lab Results   Component Value Date    LDL 51 01/14/2023             Passed - In person appointment or virtual visit in the past 12 mos or appointment in next 3 mos     Recent Outpatient Visits              2 months ago Diabetes mellitus type 2 without retinopathy ([facility] Gonzalez Rodriguez MD    Office Visit    6 months ago Essential hypertension    Gonzalez Salazar MD    Office Visit    11 months ago Chronic ischemic heart disease    Gonzalez Rodriguez MD    Office Visit    1 year ago Chronic ischemic heart disease    [employer], [address], Gonzalez Osullivan MD    Office Visit    1 year ago Diabetes mellitus type 2 without retinopathy [facility])    [employer], [address], Priscilla Lim MD    Office Visit          Future Appointments         Provider Department Appt Notes    In 1 month Esther Holland MD [employer], [place]ari [place]air, [place] annual                  Recent Outpatient Visits              2 months ago Diabetes mellitus type 2 without retinopathy [facility])    Gonzalez Rodriguez MD    Office Visit    6 months ago Essential hypertension    Gonzalez Salazar MD    Office Visit    11 months ago Chronic ischemic heart disease    Gonzalez Rodriguez MD    Office Visit    1 year ago Chronic ischemic heart disease    [employer], [address], Gonzalez Osullivan MD    Office Visit    1 year ago Diabetes mellitus type 2 without retinopathy [facility])    [place], [address], Priscilla Lim MD    Office Visit          Future Appointments         Provider Department Appt Notes    In 1 month Esther Holland MD [employer], [address] [place], [place] annual

## 2023-12-29 NOTE — TELEPHONE ENCOUNTER
Please review; protocol failed/ has no protocol    Requested Prescriptions   Pending Prescriptions Disp Refills    Ammonium Lactate 12 % External Cream [Pharmacy Med Name: AMMONIUM LACTATE 12% CRM] 385 g 1     Sig: APPLY DAILY DAILY DAILY 2 TIMES TO AFFECTED AREA       There is no refill protocol information for this order       metFORMIN 500 MG Oral Tab [Pharmacy Med Name: METFORMIN HCL 500MG TAB] 270 tablet 2     Sig: TAKE ONE TABLET BY MOUTH IN THE MORNING AND TAKE TWO TABLETS BY MOUTH IN THE EVENING       Diabetes Medication Protocol Failed - 12/28/2023 12:28 PM        Failed - Last A1C < 7.5 and within past 6 months     Lab Results   Component Value Date    A1C 6.5 (H) 01/14/2023             Passed - In person appointment or virtual visit in the past 6 mos or appointment in next 3 mos     Recent Outpatient Visits              2 months ago Diabetes mellitus type 2 without retinopathy [facility]    Ritesh Flores MD    Office Visit    6 months ago Essential hypertension    [address], [address] [facility] [place], Ritesh Carnes MD    Office Visit    11 months ago Chronic ischemic heart disease    Ritesh Flores MD    Office Visit    1 year ago Chronic ischemic heart disease    [address], [address] [facility],[address], Ritesh Carnes MD    Office Visit    1 year ago Diabetes mellitus type 2 without retinopathy [facility])    [address], [address] [facility],[address] Floor, Aldona Harada, MD    Office Visit          Future Appointments         Provider Department Appt Notes    In 1 month Isela Brooks MD [address], [address] [facility] annual               Passed - [facility] > 50     GFR Evaluation  EGFRCR: 98 , resulted on 1/14/2023          Passed - GFR in the past 12 months          lisinopril 5 MG Oral Tab [Pharmacy Med Name: LISINOPRIL 5MG TAB] 90 tablet 0     Sig: Take 1 tablet (5 mg total) by mouth daily. Hypertensive Medications Protocol Failed - 12/28/2023 12:28 PM        Failed - CMP or BMP in past 6 months     No results found for this or any previous visit (from the past 4392 hour(s)).             Passed - In person appointment in the past 12 or next 3 months     Recent Outpatient Visits              2 months ago Diabetes mellitus type 2 without retinopathy ([facility]    [address] [facility] Ciera Verdin MD    Office Visit    6 months ago Essential hypertension    Ciera Lopez MD    Office Visit    11 months ago [facility]    [facility]    Office Visit    1 year ago Chronic ischemic heart disease    [address], [address], Ciera Salgado MD    Office Visit    1 year ago Diabetes mellitus type 2 without retinopathy [facility])    [address], [address] [facility] [facility], Neena Manley MD    Office Visit          Future Appointments         Provider Department Appt Notes    In 1 month Yvonne Ward MD [address], [address] [facility] annual               Passed - Last BP reading less than 140/90     BP Readings from Last 1 Encounters:   10/09/23 120/72               Passed - In person appointment or virtual visit in the past 6 months     Recent Outpatient Visits              2 months ago Diabetes mellitus type 2 without retinopathy [facility])    [facility]    Office Visit    6 months ago Essential hypertension    Ciera Lopez MD    Office Visit    11 months ago Chronic ischemic heart disease    [facility], [address] [place] [address] Shannon Gonzales MD    Office Visit    1 year ago Chronic ischemic heart disease    [address], [address], Claudette Bennett MD    Office Visit    1 year ago Diabetes mellitus type 2 without retinopathy [facility])    [address], [address], Jovon Daley MD    Office Visit          Future Appointments         Provider Department Appt Notes    In 1 month Kurt Caldwell MD [address], [address] Sean Almeida annual               Passed - [place] [facility] > 50     GFR Evaluation  EGFRCR: 98 , resulted on 1/14/2023            metoprolol succinate ER 25 MG Oral Tablet 24 Hr [Pharmacy Med Name: METOPROLOL SUCCINATE 25MG TAB] 90 tablet 2     Sig: Take 1 tablet (25 mg total) by mouth daily. Hypertensive Medications Protocol Failed - 12/28/2023 12:28 PM        Failed - CMP or BMP in past 6 months     No results found for this or any previous visit (from the past 4392 hour(s)).             Passed - In person appointment in the past 12 or next 3 months     Recent Outpatient Visits              2 months ago Diabetes mellitus type 2 without retinopathy ([facility]    Claudette Cernshaw MD    Office Visit    6 months ago Essential hypertension    Claudette Hubbard MD    Office Visit    11 months ago Chronic ischemic heart disease    Claudette Crenshaw MD    Office Visit    1 year ago Chronic ischemic heart disease    [address], [address], Claudette Bennett MD    Office Visit    1 year ago Diabetes mellitus type 2 without retinopathy [facility])    [address] [facility], Seth Gilbert MD    Office Visit          Future Appointments         Provider Department Appt Notes    In 1 month MD Carol Low Sean Cano annual               Passed - Last BP reading less than 140/90     BP Readings from Last 1 Encounters:   10/09/23 120/72               Passed - In person appointment or virtual visit in the past 6 months     Recent Outpatient Visits              2 months ago [facility] mellitus type 2 [facility]    Remberto Beltran, [address] Le Almeida MD    Office Visit    6 months ago Essential hypertension    Le Loco MD    Office Visit    11 months ago Chronic ischemic heart disease    Le Hurtado MD    Office Visit    1 year ago Chronic ischemic heart disease    Remberto Beltran, [address], Le Scherer MD    Office Visit    1 year ago Diabetes mellitus type 2 without retinopathy [facility])    Remberto Beltran, [address], Ivis Mckeon MD    Office Visit          Future Appointments         Provider Department Appt Notes    In 1 month MD Remberto Lima, [address] Sean Almeida annual               Passed - [facility] > 50     GFR Evaluation  EGFRCR: 98 , resulted on 1/14/2023           Signed Prescriptions Disp Refills    simvastatin 20 MG Oral Tab 90 tablet 3     Sig: Take 1 tablet (20 mg total) by mouth nightly.        Cholesterol Medication Protocol Passed - 12/28/2023 12:28 PM        Passed - ALT in past 12 months        Passed - LDL in past 12 months        Passed - Last ALT < 80     Lab Results   Component Value Date    ALT 35 01/14/2023             Passed - Last LDL < 130     Lab Results   Component Value Date    LDL 51 01/14/2023             Passed - In person appointment or virtual visit in the past 12 mos or appointment in next 3 mos     Recent Outpatient Visits              2 months ago Diabetes mellitus type 2 without retinopathy ([facility] Yvonne Burton MD    Office Visit    6 months ago Essential hypertension    Yvonne Laughlin MD    Office Visit    11 months ago Chronic ischemic heart disease    Yvonne Burton MD    Office Visit    1 year ago Chronic ischemic heart disease    Elise Robles, [address], Yvonne Lopez MD    Office Visit    1 year ago Diabetes mellitus type 2 without retinopathy [facility])    Elise Robles, [address], Vincent Matias MD    Office Visit          Future Appointments         Provider Department Appt Notes    In 1 month MD Elise Willett, [address] [facility] annual                  Recent Outpatient Visits              2 months ago Diabetes mellitus type 2 without retinopathy [facility])    Yvonne Burton MD    Office Visit    6 months ago Essential hypertension    Yvonne Laughlin MD    Office Visit    11 months ago Chronic ischemic heart disease    Yvonne Burton MD    Office Visit    1 year ago Chronic ischemic heart disease    Elise Robles, [address], Yvonne Lopez MD    Office Visit    1 year ago Diabetes mellitus type 2 without retinopathy [facility])    Elise Robles, [address], Vincent Matias MD    Office Visit          Future Appointments         Provider Department Appt Notes    In 1 month MD Elise Willett, [address] [facility] annual

## 2024-01-09 ENCOUNTER — MED REC SCAN ONLY (OUTPATIENT)
Dept: INTERNAL MEDICINE CLINIC | Facility: CLINIC | Age: 69
End: 2024-01-09

## 2024-01-09 ENCOUNTER — TELEPHONE (OUTPATIENT)
Dept: INTERNAL MEDICINE CLINIC | Facility: CLINIC | Age: 69
End: 2024-01-09

## 2024-02-03 ENCOUNTER — LAB ENCOUNTER (OUTPATIENT)
Dept: LAB | Age: 69
End: 2024-02-03
Attending: INTERNAL MEDICINE
Payer: MEDICARE

## 2024-02-03 DIAGNOSIS — E11.9 DIABETES MELLITUS TYPE 2 WITHOUT RETINOPATHY (HCC): ICD-10-CM

## 2024-02-03 DIAGNOSIS — Z12.5 SCREENING PSA (PROSTATE SPECIFIC ANTIGEN): ICD-10-CM

## 2024-02-03 LAB
ALBUMIN SERPL-MCNC: 4.3 G/DL (ref 3.2–4.8)
ALBUMIN/GLOB SERPL: 1.4 {RATIO} (ref 1–2)
ALP LIVER SERPL-CCNC: 57 U/L
ALT SERPL-CCNC: 25 U/L
ANION GAP SERPL CALC-SCNC: 6 MMOL/L (ref 0–18)
AST SERPL-CCNC: 24 U/L (ref ?–34)
BILIRUB SERPL-MCNC: 1 MG/DL (ref 0.2–1.1)
BUN BLD-MCNC: 9 MG/DL (ref 9–23)
BUN/CREAT SERPL: 10.3 (ref 10–20)
CALCIUM BLD-MCNC: 8.8 MG/DL (ref 8.7–10.4)
CHLORIDE SERPL-SCNC: 104 MMOL/L (ref 98–112)
CHOLEST SERPL-MCNC: 142 MG/DL (ref ?–200)
CO2 SERPL-SCNC: 28 MMOL/L (ref 21–32)
COMPLEXED PSA SERPL-MCNC: 1.09 NG/ML (ref ?–4)
CREAT BLD-MCNC: 0.87 MG/DL
CREAT UR-SCNC: 35.2 MG/DL
DEPRECATED RDW RBC AUTO: 40.9 FL (ref 35.1–46.3)
EGFRCR SERPLBLD CKD-EPI 2021: 95 ML/MIN/1.73M2 (ref 60–?)
ERYTHROCYTE [DISTWIDTH] IN BLOOD BY AUTOMATED COUNT: 12.4 % (ref 11–15)
EST. AVERAGE GLUCOSE BLD GHB EST-MCNC: 163 MG/DL (ref 68–126)
FASTING PATIENT LIPID ANSWER: YES
FASTING STATUS PATIENT QL REPORTED: YES
GLOBULIN PLAS-MCNC: 3.1 G/DL (ref 2.8–4.4)
GLUCOSE BLD-MCNC: 117 MG/DL (ref 70–99)
HBA1C MFR BLD: 7.3 % (ref ?–5.7)
HCT VFR BLD AUTO: 38.9 %
HDLC SERPL-MCNC: 47 MG/DL (ref 40–59)
HGB BLD-MCNC: 13 G/DL
LDLC SERPL CALC-MCNC: 71 MG/DL (ref ?–100)
MCH RBC QN AUTO: 30.4 PG (ref 26–34)
MCHC RBC AUTO-ENTMCNC: 33.4 G/DL (ref 31–37)
MCV RBC AUTO: 91.1 FL
MICROALBUMIN UR-MCNC: 0.4 MG/DL
MICROALBUMIN/CREAT 24H UR-RTO: 11.4 UG/MG (ref ?–30)
NONHDLC SERPL-MCNC: 95 MG/DL (ref ?–130)
OSMOLALITY SERPL CALC.SUM OF ELEC: 286 MOSM/KG (ref 275–295)
PLATELET # BLD AUTO: 211 10(3)UL (ref 150–450)
POTASSIUM SERPL-SCNC: 4.1 MMOL/L (ref 3.5–5.1)
PROT SERPL-MCNC: 7.4 G/DL (ref 5.7–8.2)
RBC # BLD AUTO: 4.27 X10(6)UL
SODIUM SERPL-SCNC: 138 MMOL/L (ref 136–145)
TRIGL SERPL-MCNC: 136 MG/DL (ref 30–149)
TSI SER-ACNC: 4.5 MIU/ML (ref 0.55–4.78)
VLDLC SERPL CALC-MCNC: 21 MG/DL (ref 0–30)
WBC # BLD AUTO: 7.3 X10(3) UL (ref 4–11)

## 2024-02-03 PROCEDURE — 84443 ASSAY THYROID STIM HORMONE: CPT

## 2024-02-03 PROCEDURE — 80061 LIPID PANEL: CPT

## 2024-02-03 PROCEDURE — 83036 HEMOGLOBIN GLYCOSYLATED A1C: CPT

## 2024-02-03 PROCEDURE — 36415 COLL VENOUS BLD VENIPUNCTURE: CPT

## 2024-02-03 PROCEDURE — 82570 ASSAY OF URINE CREATININE: CPT

## 2024-02-03 PROCEDURE — 80053 COMPREHEN METABOLIC PANEL: CPT

## 2024-02-03 PROCEDURE — 82043 UR ALBUMIN QUANTITATIVE: CPT

## 2024-02-03 PROCEDURE — 85027 COMPLETE CBC AUTOMATED: CPT

## 2024-02-05 ENCOUNTER — OFFICE VISIT (OUTPATIENT)
Dept: INTERNAL MEDICINE CLINIC | Facility: CLINIC | Age: 69
End: 2024-02-05
Payer: MEDICARE

## 2024-02-05 VITALS
DIASTOLIC BLOOD PRESSURE: 84 MMHG | SYSTOLIC BLOOD PRESSURE: 160 MMHG | HEIGHT: 68 IN | OXYGEN SATURATION: 98 % | HEART RATE: 69 BPM | WEIGHT: 169 LBS | BODY MASS INDEX: 25.61 KG/M2

## 2024-02-05 DIAGNOSIS — H43.22 ASTEROID HYALOSIS OF LEFT EYE: ICD-10-CM

## 2024-02-05 DIAGNOSIS — E55.9 VITAMIN D DEFICIENCY: ICD-10-CM

## 2024-02-05 DIAGNOSIS — E11.9 DIABETES MELLITUS TYPE 2 WITHOUT RETINOPATHY (HCC): Primary | ICD-10-CM

## 2024-02-05 DIAGNOSIS — I83.90 UNCOMPLICATED VARICOSE VEINS: ICD-10-CM

## 2024-02-05 DIAGNOSIS — I10 ESSENTIAL HYPERTENSION: ICD-10-CM

## 2024-02-05 DIAGNOSIS — E78.5 HYPERLIPIDEMIA, UNSPECIFIED HYPERLIPIDEMIA TYPE: ICD-10-CM

## 2024-02-05 DIAGNOSIS — H25.13 AGE-RELATED NUCLEAR CATARACT OF BOTH EYES: ICD-10-CM

## 2024-02-05 DIAGNOSIS — N40.0 BENIGN PROSTATIC HYPERPLASIA WITHOUT LOWER URINARY TRACT SYMPTOMS: ICD-10-CM

## 2024-02-05 DIAGNOSIS — R21 GROIN RASH: ICD-10-CM

## 2024-02-05 DIAGNOSIS — I25.10 CORONARY ARTERY DISEASE INVOLVING NATIVE CORONARY ARTERY OF NATIVE HEART WITHOUT ANGINA PECTORIS: ICD-10-CM

## 2024-02-05 DIAGNOSIS — I25.9 CHRONIC ISCHEMIC HEART DISEASE: ICD-10-CM

## 2024-02-05 RX ORDER — CLOTRIMAZOLE AND BETAMETHASONE DIPROPIONATE 10; .64 MG/G; MG/G
45 CREAM TOPICAL 2 TIMES DAILY PRN
Qty: 60 G | Refills: 3 | Status: SHIPPED | OUTPATIENT
Start: 2024-02-05

## 2024-02-05 NOTE — PROGRESS NOTES
Subjective:   Randall Potts is a 68 year old male who presents for a MA (Medicare Advantage) Supervisit (Once per calendar year) and scheduled follow up of multiple significant but stable problems and acute uncomplicated problem.   68 gentleman here for Medicare advantage super visit.  He reports that he is doing well except having episodes of rash in the groin and balanitis.  No abuse no depression no falls reported.    History/Other:   Fall Risk Assessment:   He has been screened for Falls and is low risk.      Cognitive Assessment:   Abnormal  What day of the week is this?: Correct  What month is it?: Correct  What year is it?: Correct  Recall \"Ball\": Incorrect  Recall \"Flag\": Incorrect  Recall \"Tree\": Incorrect    Functional Ability/Status:   Randall Potts has a completely normal functional assessment. See flowsheet for details.      Depression Screening (PHQ-2/PHQ-9): PHQ-2 SCORE: 0  , done 2/5/2024        <5 minutes spent screening and counseling for depression    Advanced Directives:   He does NOT have a Living Will. [ ]  He does NOT have a Power of  for Health Care. [ ]  Discussed Advance Care Planning with patient (and family/surrogate if present). Standard forms made available to patient in After Visit Summary.      Patient Active Problem List   Diagnosis    Chronic ischemic heart disease    Essential hypertension    Hyperlipidemia    Benign prostatic hyperplasia without lower urinary tract symptoms    Uncomplicated varicose veins    Vitamin D deficiency    Diabetes mellitus type 2 without retinopathy (HCC)    Age-related nuclear cataract of both eyes    Lesion of tongue chronic    Coronary artery disease involving native coronary artery of native heart without angina pectoris    Asteroid hyalosis of left eye     Allergies:  He has No Known Allergies.    Current Medications:  Outpatient Medications Marked as Taking for the 2/5/24 encounter (Office Visit) with Barak Cardozo MD    Medication Sig    clotrimazole-betamethasone 1-0.05 % External Cream Apply 45 g topically 2 (two) times daily as needed.    Ammonium Lactate 12 % External Cream APPLY DAILY DAILY DAILY 2 TIMES TO AFFECTED AREA    metFORMIN 500 MG Oral Tab TAKE ONE TABLET BY MOUTH IN THE MORNING AND TAKE TWO TABLETS BY MOUTH IN THE EVENING    lisinopril 5 MG Oral Tab Take 1 tablet (5 mg total) by mouth daily.    metoprolol succinate ER 25 MG Oral Tablet 24 Hr Take 1 tablet (25 mg total) by mouth daily.    simvastatin 20 MG Oral Tab Take 1 tablet (20 mg total) by mouth nightly.    aspirin 81 MG Oral Tab EC Take 1 tablet (81 mg total) by mouth daily.    clotrimazole-betamethasone 1-0.05 % External Cream Apply 1 Application topically 2 (two) times daily.    Vitamin D3 (VITAMIN D3) 1000 UNITS Oral Tab Take  by mouth.       Medical History:  He  has a past medical history of Age-related nuclear cataract of both eyes (4/23/2015), Anemia (9/27/2012), Asteroid hyalosis of left eye (7/9/2022), CAD (coronary artery disease) (2012), Diabetes mellitus (HCC) (9/26/2014), Diabetes mellitus type 2 without retinopathy (HCC) (4/23/2015), Essential hypertension with goal blood pressure less than 140/90 (11/14/2016), Fungal infection, Other and unspecified hyperlipidemia, Type II or unspecified type diabetes mellitus without mention of complication, not stated as uncontrolled (Dx 2014), Unspecified essential hypertension, Urethral stricture, and Varicose veins (2007).  Surgical History:  He  has a past surgical history that includes angioplasty (coronary); cystoscopy,dil urethral stricture (2000); colonoscopy (2009); upper gi endoscopy,exam (2009); and other surgical history (Right).   Family History:  His family history includes Dementia in his father; Diabetes in his brother, father, mother, and sister; Heart Disease (age of onset: 55) in his mother; Lipids in his father and mother.  Social History:  He  reports that he has never smoked. He has  never used smokeless tobacco. He reports that he does not drink alcohol and does not use drugs.    Tobacco:  He has never smoked tobacco.    CAGE Alcohol Screen:   CAGE screening score of 0 on 1/30/2024, showing low risk of alcohol abuse.      Patient Care Team:  Barak Cardozo MD as PCP - General (Internal Medicine)    Review of Systems   Constitutional:  Negative for activity change, appetite change and fever.   HENT:  Negative for congestion and voice change.    Respiratory:  Negative for cough and shortness of breath.    Cardiovascular:  Negative for chest pain.   Gastrointestinal:  Negative for abdominal distention, abdominal pain and vomiting.   Genitourinary:  Negative for hematuria.   Skin:  Negative for wound.   Psychiatric/Behavioral:  Negative for behavioral problems.          Objective:   Physical Exam  Constitutional:       Appearance: Normal appearance.   HENT:      Head: Normocephalic.      Right Ear: Tympanic membrane normal.      Left Ear: Tympanic membrane normal.      Nose: Nose normal.      Mouth/Throat:      Mouth: Mucous membranes are moist.      Pharynx: Oropharynx is clear.   Eyes:      General: No scleral icterus.     Conjunctiva/sclera: Conjunctivae normal.   Neck:      Vascular: No carotid bruit.   Cardiovascular:      Rate and Rhythm: Normal rate and regular rhythm.      Pulses: Normal pulses.      Heart sounds: Normal heart sounds. No murmur heard.  Pulmonary:      Effort: Pulmonary effort is normal. No respiratory distress.      Breath sounds: Normal breath sounds. No wheezing or rales.   Abdominal:      General: Bowel sounds are normal.      Palpations: Abdomen is soft.      Tenderness: There is no abdominal tenderness. There is no guarding or rebound.   Musculoskeletal:      Cervical back: Neck supple. No rigidity or tenderness.      Right lower leg: No edema.      Left lower leg: No edema.   Lymphadenopathy:      Cervical: No cervical adenopathy.   Skin:     General: Skin is warm  and dry.   Neurological:      Mental Status: He is alert and oriented to person, place, and time.      Cranial Nerves: No cranial nerve deficit.      Coordination: Coordination normal.   Psychiatric:         Mood and Affect: Mood normal.         Behavior: Behavior normal.         Thought Content: Thought content normal.         Judgment: Judgment normal.   Varicosities present  Bilateral barefoot skin diabetic exam is normal, visualized feet and the appearance is normal.  Bilateral monofilament/sensation of both feet is normal.  Pulsation pedal pulse exam of both lower legs/feet is normal as well.     /84   Pulse 69   Ht 5' 8\" (1.727 m)   Wt 169 lb (76.7 kg)   SpO2 98%   BMI 25.70 kg/m²  Estimated body mass index is 25.7 kg/m² as calculated from the following:    Height as of this encounter: 5' 8\" (1.727 m).    Weight as of this encounter: 169 lb (76.7 kg).    Medicare Hearing Assessment:   Hearing Screening    Time taken: 2/5/2024 10:17 AM  Entry User: Jonna Gastelum CMA  Screening Method: Finger Rub  Finger Rub Result: Pass                     Assessment & Plan:   Randall Potts is a 68 year old male who presents for a Medicare Assessment.     1. Diabetes mellitus type 2 without retinopathy (HCC) (Primary) monitor A1c.  Discussed diet.  2. Chronic ischemic heart disease stable    4. Hyperlipidemia, unspecified hyperlipidemia type continue statins   5. Uncomplicated varicose veins status post treatment in [place].  Encourage patient to elevate legs up.  Use compression stockings  6. Coronary artery disease involving native coronary artery of native heart without angina pectoris  Overview:  S/p PCI in [place]   7. Vitamin D deficiency stable  8. Benign prostatic hyperplasia without lower urinary tract symptoms  Overview:  Reportedly urethral stricture   9. Age-related nuclear cataract of both eyes  And  10. Asteroid hyalosis of left eye-follows up with ophthalmology    Additional evaluation apart from  Medicare advantage super visit  11. Groin rash most likely candidal rash.  Will treat with clotrimazole betamethasone cream.  If there is no improvement, he will contact me.  Other orders  -     Clotrimazole-Betamethasone; Apply 45 g topically 2 (two) times daily as needed.  Dispense: 60 g; Refill: 3    3. Essential hypertension I rechecked the blood pressure.  It is running high.  He informed him that his home log is excellent.  He will continue to monitor blood pressure at home and if it is elevated he will contact me.    The patient indicates understanding of these issues and agrees to the plan.  Reinforced healthy diet, lifestyle, and exercise.      No follow-ups on file.     Barak Cardozo MD, 2/5/2024     Supplementary Documentation:   General Health:           Randall Potts's SCREENING SCHEDULE   Tests on this list are recommended by your physician but may not be covered, or covered at this frequency, by your insurer.   Please check with your insurance carrier before scheduling to verify coverage.   PREVENTATIVE SERVICES FREQUENCY &  COVERAGE DETAILS LAST COMPLETION DATE   Diabetes Screening    Fasting Blood Sugar / Glucose    One screening every 12 months if never tested or if previously tested but not diagnosed with pre-diabetes   One screening every 6 months if diagnosed with pre-diabetes Lab Results   Component Value Date     (H) 02/03/2024        Cardiovascular Disease Screening    Lipid Panel  Cholesterol  Lipoprotein (HDL)  Triglycerides Covered every 5 years for all Medicare beneficiaries without apparent signs or symptoms of cardiovascular disease Lab Results   Component Value Date    CHOLEST 142 02/03/2024    HDL 47 02/03/2024    LDL 71 02/03/2024    TRIG 136 02/03/2024         Electrocardiogram (EKG)   Covered if needed at Welcome to Medicare, and non-screening if indicated for medical reasons 11/14/2016      Ultrasound Screening for Abdominal Aortic Aneurysm (AAA) Covered once in a  lifetime for one of the following risk factors    Men who are 65-75 years old and have ever smoked    Anyone with a family history -     Colorectal Cancer Screening  Covered for ages 50-85; only need ONE of the following:    Colonoscopy   Covered every 10 years    Covered every 2 years if patient is at high risk or previous colonoscopy was abnormal -    Health Maintenance   Topic Date Due    Colorectal Cancer Screening  06/08/2024       Flexible Sigmoidoscopy   Covered every 4 years -    Fecal Occult Blood Test Covered annually 06/08/2023   Prostate Cancer Screening    Prostate-Specific Antigen (PSA) Annually No results found for: \"PSA\"  Health Maintenance   Topic Date Due    PSA  02/03/2026      Immunizations    Influenza Covered once per flu season  Please get every year 10/09/2023  No recommendations at this time    Pneumococcal Each vaccine ([id number] & [id number]) covered once after 65 Prevnar 13: 02/10/2017    Zpixzzulm54: 04/22/2014     No recommendations at this time    Hepatitis B One screening covered for patients with certain risk factors   -  No recommendations at this time    Tetanus Toxoid Not covered by Medicare Part B unless medically necessary (cut with metal); may be covered with your pharmacy prescription benefits -    Tetanus, Diptheria and Pertusis TD and TDaP Not covered by Medicare Part B -  No recommendations at this time    Zoster Not covered by Medicare Part B; may be covered with your pharmacy  prescription benefits -  Zoster Vaccines(2 of 2) due on 11/16/2020     Diabetes      Hemoglobin A1C Annually; if last result is elevated, may be asked to retest more frequently.    Medicare covers every 3 months Lab Results   Component Value Date     (H) 02/03/2024    A1C 7.3 (H) 02/03/2024       No recommendations at this time    Creat/alb ratio Annually Lab Results   Component Value Date    MICROALBCREA 11.4 02/03/2024       LDL Annually Lab Results   Component Value Date    LDL 71  02/03/2024       Dilated Eye Exam Annually Last Diabetic Eye Exam:  Last Dilated Eye Exam: 12/16/23  Eye Exam shows Diabetic Retinopathy?: No       Annual Monitoring of Persistent Medications (ACE/ARB, digoxin diuretics, anticonvulsants)    Potassium Annually Lab Results   Component Value Date    K 4.1 02/03/2024         Creatinine   Annually Lab Results   Component Value Date    CREATSERUM 0.87 02/03/2024         BUN Annually Lab Results   Component Value Date    BUN 9 02/03/2024       Drug Serum Conc Annually No results found for: \"DIGOXIN\", \"DIG\", \"VALP\"

## 2024-02-12 ENCOUNTER — PATIENT MESSAGE (OUTPATIENT)
Dept: INTERNAL MEDICINE CLINIC | Facility: CLINIC | Age: 69
End: 2024-02-12

## 2024-02-13 NOTE — TELEPHONE ENCOUNTER
From: Randall Bernstein  To: Barak Cardozo  Sent: 2/12/2024 12:24 PM CST  Subject: From Randall bernstein     I checked my BP from one week and it is normal (120/70)

## 2024-02-14 NOTE — TELEPHONE ENCOUNTER
Patient's daughter Zeus (on original RICHA) calling to make sure that patient's blood pressure was sent correctly as patient did it himself and appointment were correct  Informed Zeus that, yes they were received correctly and dr. Cardozo reviewed the blood pressure  Confirmed with daughter that patient and herself saw the response

## 2024-03-27 ENCOUNTER — OFFICE VISIT (OUTPATIENT)
Dept: INTERNAL MEDICINE CLINIC | Facility: CLINIC | Age: 69
End: 2024-03-27
Payer: MEDICARE

## 2024-03-27 VITALS
HEART RATE: 82 BPM | HEIGHT: 68 IN | OXYGEN SATURATION: 98 % | WEIGHT: 166.81 LBS | BODY MASS INDEX: 25.28 KG/M2 | DIASTOLIC BLOOD PRESSURE: 82 MMHG | SYSTOLIC BLOOD PRESSURE: 120 MMHG

## 2024-03-27 DIAGNOSIS — Z12.11 SCREEN FOR COLON CANCER: ICD-10-CM

## 2024-03-27 DIAGNOSIS — E78.5 HYPERLIPIDEMIA, UNSPECIFIED HYPERLIPIDEMIA TYPE: ICD-10-CM

## 2024-03-27 DIAGNOSIS — E11.9 DIABETES MELLITUS TYPE 2 WITHOUT RETINOPATHY (HCC): Primary | ICD-10-CM

## 2024-03-27 DIAGNOSIS — I10 ESSENTIAL HYPERTENSION: ICD-10-CM

## 2024-03-27 PROBLEM — R94.39 ABNORMAL CARDIOVASCULAR STRESS TEST: Status: ACTIVE | Noted: 2022-10-17

## 2024-03-27 PROBLEM — L60.3 DYSTROPHIC NAIL: Status: ACTIVE | Noted: 2020-09-11

## 2024-03-27 PROBLEM — L30.9 ECZEMA: Status: ACTIVE | Noted: 2017-10-10

## 2024-03-27 PROCEDURE — 3008F BODY MASS INDEX DOCD: CPT | Performed by: INTERNAL MEDICINE

## 2024-03-27 PROCEDURE — 99214 OFFICE O/P EST MOD 30 MIN: CPT | Performed by: INTERNAL MEDICINE

## 2024-03-27 PROCEDURE — 3079F DIAST BP 80-89 MM HG: CPT | Performed by: INTERNAL MEDICINE

## 2024-03-27 PROCEDURE — 1159F MED LIST DOCD IN RCRD: CPT | Performed by: INTERNAL MEDICINE

## 2024-03-27 PROCEDURE — 3074F SYST BP LT 130 MM HG: CPT | Performed by: INTERNAL MEDICINE

## 2024-03-27 PROCEDURE — 1126F AMNT PAIN NOTED NONE PRSNT: CPT | Performed by: INTERNAL MEDICINE

## 2024-03-27 NOTE — PROGRESS NOTES
Randall Potts is a 68 year old male.  Chief Complaint   Patient presents with    Blood Pressure    Follow - Up     HPI:   68-year-old gentleman here for follow-up.  He report that he is doing well.  He stopped metoprolol because of low blood pressure.  Today we rechecked the blood pressure it is running good.  No hypoglycemic events reported.  Denies any chest pain or shortness of breath.  He would like to complete his colonoscopy on either Saturday or Monday.      Current Outpatient Medications   Medication Sig Dispense Refill    clotrimazole-betamethasone 1-0.05 % External Cream Apply 45 g topically 2 (two) times daily as needed. 60 g 3    Ammonium Lactate 12 % External Cream APPLY DAILY DAILY DAILY 2 TIMES TO AFFECTED AREA 385 g 1    metFORMIN 500 MG Oral Tab TAKE ONE TABLET BY MOUTH IN THE MORNING AND TAKE TWO TABLETS BY MOUTH IN THE EVENING 270 tablet 2    lisinopril 5 MG Oral Tab Take 1 tablet (5 mg total) by mouth daily. 90 tablet 0    simvastatin 20 MG Oral Tab Take 1 tablet (20 mg total) by mouth nightly. 90 tablet 3    aspirin 81 MG Oral Tab EC Take 1 tablet (81 mg total) by mouth daily.      Vitamin D3 (VITAMIN D3) 1000 UNITS Oral Tab Take  by mouth.        Past Medical History:   Diagnosis Date    Age-related nuclear cataract of both eyes 4/23/2015    Anemia 9/27/2012    Asteroid hyalosis of left eye 7/9/2022    CAD (coronary artery disease) 2012    x2.     Diabetes mellitus (HCC) 9/26/2014    Diabetes mellitus type 2 without retinopathy (HCC) 4/23/2015    Essential hypertension with goal blood pressure less than 140/90 11/14/2016    Fungal infection     Toingue. bx.     Other and unspecified hyperlipidemia     Type II or unspecified type diabetes mellitus without mention of complication, not stated as uncontrolled Dx 2014    Pills only    Unspecified essential hypertension     Urethral stricture     Varicose veins 2007    stripping and ligation of varicosites      Past Surgical History:    Procedure Laterality Date    ANGIOPLASTY (CORONARY)      COLONOSCOPY  2009    CYSTOSCOPY,DIL URETHRAL STRICTURE  2000    OTHER SURGICAL HISTORY Right     Varicose vein removal.     UPPER GI ENDOSCOPY,EXAM  2009      Social History:  Social History     Socioeconomic History    Marital status:    Occupational History    Occupation: [employer].   Tobacco Use    Smoking status: Never    Smokeless tobacco: Never   Vaping Use    Vaping Use: Never used   Substance and Sexual Activity    Alcohol use: No     Alcohol/week: 0.0 standard drinks of alcohol    Drug use: No    Sexual activity: Yes   Other Topics Concern    Caffeine Concern Yes     Comment: tea/coffee - 2 cups      Family History   Problem Relation Age of Onset    Diabetes Father     Dementia Father         Parkinson's.     Lipids Father     Diabetes Mother     Heart Disease Mother 55    Lipids Mother     Diabetes Sister     Diabetes Brother     Glaucoma Neg     Macular degeneration Neg       No Known Allergies     REVIEW OF SYSTEMS:   Review of Systems   Review of Systems   Constitutional: Negative for activity change, appetite change and fever.   HENT: Negative for congestion and voice change.    Respiratory: Negative for cough and shortness of breath.    Cardiovascular: Negative for chest pain.   Gastrointestinal: Negative for abdominal distention, abdominal pain and vomiting.   Genitourinary: Negative for hematuria.   Skin: Negative for wound.   Psychiatric/Behavioral: Negative for behavioral problems.   Wt Readings from Last 5 Encounters:   03/27/24 166 lb 12.8 oz (75.7 kg)   02/05/24 169 lb (76.7 kg)   10/09/23 170 lb (77.1 kg)   06/06/23 162 lb (73.5 kg)   01/16/23 162 lb (73.5 kg)     Body mass index is 25.36 kg/m².      EXAM:   /82   Pulse 82   Ht 5' 8\" (1.727 m)   Wt 166 lb 12.8 oz (75.7 kg)   SpO2 98%   BMI 25.36 kg/m²   Physical Exam   Constitutional:       Appearance: Normal appearance.   HENT:      Head: Normocephalic.   Eyes:       Conjunctiva/sclera: Conjunctivae normal.   Cardiovascular:      Rate and Rhythm: Normal rate and regular rhythm.      Heart sounds: Normal heart sounds. No murmur heard.  Pulmonary:      Effort: Pulmonary effort is normal.      Breath sounds: Normal breath sounds. No rhonchi or rales.   Abdominal:      General: Bowel sounds are normal.      Palpations: Abdomen is soft.      Tenderness: There is no abdominal tenderness.   Musculoskeletal:      Cervical back: Neck supple.      Right lower leg: No edema.      Left lower leg: No edema.   Skin:     General: Skin is warm and dry.   Neurological:      General: No focal deficit present.      Mental Status: He is alert and oriented to person, place, and time. Mental status is at baseline.   Psychiatric:         Mood and Affect: Mood normal.         Behavior: Behavior normal.       ASSESSMENT AND PLAN:   1. Diabetes mellitus type 2 without retinopathy (HCC)  A1c 7.3.  Continue lisinopril and statins.  Up-to-date on eye exam and foot exam.    2. Essential hypertension  Blood pressure is optimally controlled.  I have rechecked the blood pressure.  It was 126/76.  Continue lisinopril.  He is off metoprolol.  3. Hyperlipidemia, unspecified hyperlipidemia type  Continue statins.  Lipid profile LFT reviewed.  We will continue with the current medical regimen    4. Screen for colon cancer    - GASTRO - INTERNAL    Plan: As above.  I will see him back in 6 months      The patient indicates understanding of these issues and agrees to the plan.  No follow-ups on file.    This note was prepared using Dragon Medical voice recognition dictation software. As a result errors may occur. When identified these errors have been corrected. While every attempt is made to correct errors during dictation discrepancies may still exist.

## 2024-04-11 ENCOUNTER — TELEPHONE (OUTPATIENT)
Facility: CLINIC | Age: 69
End: 2024-04-11

## 2024-04-11 NOTE — TELEPHONE ENCOUNTER
Patent's daughter calling regards if pt needs consult or screening or can schedule CLN. Please call. (Can only take calls in AM)

## 2024-04-17 NOTE — TELEPHONE ENCOUNTER
Dr. Owen    Patient called to schedule recall colonoscopy with you.  He had orders to schedule from 22 T[facility] appointment but those orders are now .  Would you be willing to provide new orders?  Below questions reviewed with Daughter.    Thank you    Last Procedure, Date, MD:  2009 Colonoscopy and EGD  Last Diagnosis:  internal hemorrhoids, small gastric nodules, likely fundic glands, mild reflux changes distal esophagus  Recalled (mth/yrs): 10 years  Sedation Used Previously:    Last Prep Used (if known):  n/a  Quality Of Prep (if known): adequate  Anticoagulants: no  Diabetic Med's (PO/Injectables): metformin  Weight loss Med's: no  Iron/Herbal/Multivitamin Supplements (RX/OTC): Vitamin D3  Marijuana/Vaping/CBD: no  Height & Weight: 5'8\" 166 lbs 12.8 oz  BMI: 25.37  Hx of Cardiac/CVA Issues/(MI/Stroke):  MI  Devices Pacemaker/Defibrillator/Stents: stent-per PCP note PCI in [place]  Respiratory Issues/Oxygen Use/IRENE/COPD: no  Issues w/ Anesthesia: no    Symptoms (Y/N): no  Symptoms Details: n/a    Special Comments/Notes: Prefers Monday or Saturday procedure - states was told by Dr. Cardozo to ask for these days of the week    Please advise on orders and prep.     Thank you!

## 2024-04-22 RX ORDER — LISINOPRIL 5 MG/1
5 TABLET ORAL DAILY
Qty: 90 TABLET | Refills: 3 | Status: SHIPPED | OUTPATIENT
Start: 2024-04-22

## 2024-04-22 NOTE — TELEPHONE ENCOUNTER
Refill passed per protocol.    Requested Prescriptions   Pending Prescriptions Disp Refills    LISINOPRIL 5 MG Oral Tab [Pharmacy Med Name: LISINOPRIL 5MG TAB] 90 tablet 0     Sig: Take 1 tablet (5 mg total) by mouth daily.       Hypertension Medications Protocol Passed - 4/19/2024  5:20 PM        Passed - CMP or BMP in past 12 months        Passed - Last BP reading less than 140/90     BP Readings from Last 1 Encounters:   03/27/24 120/82               Passed - In person appointment or virtual visit in the past 12 mos or appointment in next 3 mos     Recent Outpatient Visits              3 weeks ago Diabetes mellitus type 2 without retinopathy (HCC)    [facility] Barak Cardozo MD    Office Visit    2 months ago [facility] type [facility] retin[facility]    [facility]    6 months ago Diabetes mellitus type 2 without retinopathy (HCC)    [facility]    Office Visit    10 months ago Essential hypertension    [facility]    Office Visit    1 year ago Chronic ischemic heart disease    [facility]    Office Visit          Future Appointments         Provider Department Appt Notes    In 3 months Barak Cardozo MD [facility] 6 MTH FOLLOW UP                    Passed - EGFRCR or GFRNAA > 50     GFR Evaluation  EGFRCR: 95 , resulted on 2/3/2024               Future Appointments         Provider Department Appt Notes    In 3 months Barak Cardozo MD [facility] 6 MTH FOLLOW UP          Recent Outpatient Visits              3 weeks ago Diabetes mellitus type 2 without retinopathy (HCC)    [facility]  [facility], [facility], Barak Perry MD    Office Visit    2 months ago Diabetes mellitus type 2 without retinopathy (HCC)    [facility], [facility] Barak Perry MD    Office Visit    6 months ago Diabetes mellitus type 2 without retinopathy (HCC)    [facility], [facility], Barak Perry MD    Office Visit    10 months ago Essential hypertension    [facility], [facility], Barak Perry MD    Office Visit    1 year ago Chronic ischemic heart disease    [facility], [facility], Barak Perry MD    Office Visit

## 2024-04-30 RX ORDER — SIMVASTATIN 20 MG
20 TABLET ORAL NIGHTLY
Qty: 90 TABLET | Refills: 3 | Status: SHIPPED | OUTPATIENT
Start: 2024-04-30

## 2024-04-30 RX ORDER — METOPROLOL SUCCINATE 25 MG/1
25 TABLET, EXTENDED RELEASE ORAL DAILY
Qty: 90 TABLET | Refills: 1 | OUTPATIENT
Start: 2024-04-30

## 2024-04-30 NOTE — TELEPHONE ENCOUNTER
Refill passed per [facility] protocol.  Requested Prescriptions   Pending Prescriptions Disp Refills    SIMVASTATIN 20 MG Oral Tab [Pharmacy Med Name: SIMVASTATIN 20mg TAB] 90 tablet 2     Sig: TAKE ONE TABLET BY MOUTH AT BEDTIME (CHOLESTEROL)       Cholesterol Medication Protocol Passed - 4/29/2024  1:59 PM        Passed - ALT < 80     Lab Results   Component Value Date    ALT 25 02/03/2024             Passed - ALT resulted within past year        Passed - Lipid panel within past 12 months     Lab Results   Component Value Date    CHOLEST 142 02/03/2024    TRIG 136 02/03/2024    HDL 47 02/03/2024    LDL 71 02/03/2024    VLDL 21 02/03/2024    NONHDLC 95 02/03/2024             Passed - In person appointment or virtual visit in the past 12 mos or appointment in next 3 mos     Recent Outpatient Visits              1 month ago Diabetes mellitus type 2 without retinopathy (HCC)    [facility] Barak Cardozo MD    Office Visit    2 months ago Diabetes mellitus type 2 without retinopathy (HCC)    [facility] Barak Cardozo MD    Office Visit    6 months ago Diabetes mellitus type 2 without retinopathy (HCC)    [facility] Barak Cardozo MD    Office Visit    10 months ago Essential hypertension    [facility] Barak Cardozo MD    Office Visit    1 year ago Chronic ischemic heart disease    [facility] Barak Cardozo MD    Office Visit          Future Appointments         Provider Department Appt Notes    In 3 months Barak Cardozo MD [facility] 6 MTH FOLLOW UP                      METFORMIN 500 MG Oral Tab [Pharmacy Med Name: METFORMIN HCL 500MG TAB] 270 tablet 1     Sig: TAKE ONE TABLET BY MOUTH IN THE MORNING AND TAKE TWO TABLETS BY MOUTH  IN THE EVENING       Diabetes Medication Protocol Passed - 4/29/2024  1:59 PM        Passed - Last A1C < 7.5 and within past 6 months     Lab Results   Component Value Date    A1C 7.3 (H) 02/03/2024             Passed - In person appointment or virtual visit in the past 6 mos or appointment in next 3 mos     Recent Outpatient Visits              1 month ago Diabetes mellitus type 2 without retinopathy (HCC)    [facility]    Office Visit    2 months ago Diabetes mellitus type 2 without retinopathy (HCC)    [facility]    Office Visit    6 months ago Diabetes mellitus type 2 without retinopathy (HCC)    [facility]    Office Visit    10 months ago Essential hypertension    [facility]    Office Visit    1 year ago Chronic ischemic heart disease    [facility]    Office Visit          Future Appointments         Provider Department Appt Notes    In 3 months Barak Cardozo MD [facility] 6 MTH FOLLOW UP                    Passed - Microalbumin procedure in past 12 months or taking ACE/ARB        Passed - EGFRCR or GFRNAA > 50     GFR Evaluation  EGFRCR: 95 , resulted on 2/3/2024          Passed - GFR in the past 12 months         Refused Prescriptions Disp Refills    METOPROLOL SUCCINATE ER 25 MG Oral Tablet 24 Hr [Pharmacy Med Name: METOPROLOL SUCCINATE 25MG TAB] 90 tablet 1     Sig: TAKE 1 TABLET (25 MG TOTAL) BY MOUTH DAILY.       Hypertension Medications Protocol Passed - 4/29/2024  1:59 PM        Passed - CMP or BMP in past 12 months        Passed - Last BP reading less than 140/90     BP Readings from Last 1 Encounters:   03/27/24 120/82                Passed - In person appointment or virtual visit in the past 12 mos or appointment in next 3 mos     Recent Outpatient Visits              1 month ago Diabetes mellitus type 2 without retinopathy (HCC)    [facility]    Office Visit    2 months ago Diabetes mellitus type 2 without retinopathy (HCC)    [facility]    [facility] Visit    6 months ago Diabetes mellitus type 2 without retinopathy (HCC)    [facility]    Office Visit    10 months ago Essential hypertension    [facility] Barak Perry MD    Office Visit    1 year ago Chronic ischemic heart disease    [facility]    [facility] Visit          Future Appointments         Provider Department Appt Notes    In 3 months [facility] Cardozo MD [facility]                    Passed - EGFRCR or GFRNAA > 50     GFR Evaluation  EGFRCR: 95 , resulted on 2/3/2024             Recent Outpatient Visits              1 month ago Diabetes mellitus type 2 without retinopathy (HCC)    [facility]    Office Visit    2 months ago Diabetes mellitus type 2 without retinopathy (HCC)    [facility]    [facility] Visit    6 months ago Diabetes mellitus type 2 without retinopathy (HCC)    [facility]    Office Visit    10 months ago Essential hypertension    [facility] Barak Perry MD    Office Visit    1 year ago Chronic ischemic heart disease    [facility]  Sean Cano Joseph, MD    Office Visit          Future Appointments         Provider Department Appt Notes    In 3 months Barak Cardozo MD [facility], [facility], [place] 6 MTH FOLLOW UP

## 2024-05-03 ENCOUNTER — TELEPHONE (OUTPATIENT)
Facility: CLINIC | Age: 69
End: 2024-05-03

## 2024-05-03 NOTE — TELEPHONE ENCOUNTER
Busy dial tone. *2nd attempt*    If patient call back please forward to GI schedulers.    Thank you!

## 2024-05-03 NOTE — TELEPHONE ENCOUNTER
Schedulers:  Please call daughter to schedule colonoscopy ok to talk to her per FYI.    Orders located in 4/11/24 telephone encounter.    Thank you

## 2024-05-03 NOTE — TELEPHONE ENCOUNTER
Patients daughter calling to schedule colonoscopy, informed of the 5 business day turnaround. Please call at [phone],thanks.  *Patients daughter indicates Saturday is preferred

## 2024-05-10 NOTE — TELEPHONE ENCOUNTER
[facility] discuss scheduling options with Dr. Owen at [facility].

## 2024-06-03 RX ORDER — SIMVASTATIN 20 MG
20 TABLET ORAL NIGHTLY
Qty: 90 TABLET | Refills: 2 | OUTPATIENT
Start: 2024-06-03

## 2024-06-13 NOTE — TELEPHONE ENCOUNTER
3 attempts were made to contact this patient to schedule a procedure.  I sent a \"no response\" letter out today. If patient calls back please transfer call to schedulers.  Closing this TE

## 2024-08-05 ENCOUNTER — OFFICE VISIT (OUTPATIENT)
Dept: INTERNAL MEDICINE CLINIC | Facility: CLINIC | Age: 69
End: 2024-08-05
Payer: MEDICARE

## 2024-08-05 VITALS
OXYGEN SATURATION: 97 % | HEART RATE: 80 BPM | DIASTOLIC BLOOD PRESSURE: 80 MMHG | BODY MASS INDEX: 25.16 KG/M2 | WEIGHT: 166 LBS | HEIGHT: 68 IN | SYSTOLIC BLOOD PRESSURE: 132 MMHG

## 2024-08-05 DIAGNOSIS — E78.5 HYPERLIPIDEMIA, UNSPECIFIED HYPERLIPIDEMIA TYPE: ICD-10-CM

## 2024-08-05 DIAGNOSIS — Z12.11 SCREEN FOR COLON CANCER: ICD-10-CM

## 2024-08-05 DIAGNOSIS — E11.9 DIABETES MELLITUS TYPE 2 WITHOUT RETINOPATHY (HCC): Primary | ICD-10-CM

## 2024-08-05 DIAGNOSIS — I10 ESSENTIAL HYPERTENSION: ICD-10-CM

## 2024-08-05 DIAGNOSIS — I25.10 CORONARY ARTERY DISEASE INVOLVING NATIVE CORONARY ARTERY OF NATIVE HEART WITHOUT ANGINA PECTORIS: ICD-10-CM

## 2024-08-05 LAB — HEMOGLOBIN A1C: 6.9 % (ref 4.3–5.6)

## 2024-08-05 PROCEDURE — G2211 COMPLEX E/M VISIT ADD ON: HCPCS | Performed by: INTERNAL MEDICINE

## 2024-08-05 PROCEDURE — 3079F DIAST BP 80-89 MM HG: CPT | Performed by: INTERNAL MEDICINE

## 2024-08-05 PROCEDURE — 1159F MED LIST DOCD IN RCRD: CPT | Performed by: INTERNAL MEDICINE

## 2024-08-05 PROCEDURE — 3008F BODY MASS INDEX DOCD: CPT | Performed by: INTERNAL MEDICINE

## 2024-08-05 PROCEDURE — 99214 OFFICE O/P EST MOD 30 MIN: CPT | Performed by: INTERNAL MEDICINE

## 2024-08-05 PROCEDURE — 3075F SYST BP GE 130 - 139MM HG: CPT | Performed by: INTERNAL MEDICINE

## 2024-08-05 PROCEDURE — 1126F AMNT PAIN NOTED NONE PRSNT: CPT | Performed by: INTERNAL MEDICINE

## 2024-08-05 PROCEDURE — 83036 HEMOGLOBIN GLYCOSYLATED A1C: CPT | Performed by: INTERNAL MEDICINE

## 2024-08-05 PROCEDURE — 3044F HG A1C LEVEL LT 7.0%: CPT | Performed by: INTERNAL MEDICINE

## 2024-08-05 NOTE — PROGRESS NOTES
Randall Potts is a 68 year old male.  Chief Complaint   Patient presents with    Follow - Up     6 month follow up     HPI:   68-year-old gentleman here for follow-up.  He reports that he is doing well.  Denies any hypoglycemic events.  He has been taking his medicine regularly.  He denies any chest pain or shortness of breath.      Current Outpatient Medications   Medication Sig Dispense Refill    simvastatin 20 MG Oral Tab Take 1 tablet (20 mg total) by mouth nightly. 90 tablet 3    metFORMIN 500 MG Oral Tab TAKE ONE TABLET BY MOUTH IN THE MORNING AND TAKE TWO TABLETS BY MOUTH IN THE EVENING 270 tablet 3    lisinopril 5 MG Oral Tab Take 1 tablet (5 mg total) by mouth daily. 90 tablet 3    clotrimazole-betamethasone 1-0.05 % External Cream Apply 45 g topically 2 (two) times daily as needed. 60 g 3    Ammonium Lactate 12 % External Cream APPLY DAILY DAILY DAILY 2 TIMES TO AFFECTED AREA 385 g 1    aspirin 81 MG Oral Tab EC Take 1 tablet (81 mg total) by mouth daily.      Vitamin D3 (VITAMIN D3) 1000 UNITS Oral Tab Take  by mouth.        Past Medical History:    Age-related nuclear cataract of both eyes    Anemia    Asteroid hyalosis of left eye    CAD (coronary artery disease)    x2.     Diabetes mellitus (HCC)    Diabetes mellitus type 2 without retinopathy (HCC)    Essential hypertension with goal blood pressure less than 140/90    Fungal infection    Toingue. bx.     Other and unspecified hyperlipidemia    Type II or unspecified type diabetes mellitus without mention of complication, not stated as uncontrolled    Pills only    Unspecified essential hypertension    Urethral stricture    Varicose veins    stripping and ligation of varicosites      Past Surgical History:   Procedure Laterality Date    Angioplasty (coronary)      Colonoscopy  2009    Cystoscopy,dil urethral stricture  2000    Other surgical history Right     Varicose vein removal.     Upper gi endoscopy,exam  2009      Social History:  Social  History     Socioeconomic History    Marital status:    Occupational History    Occupation: [employer].   Tobacco Use    Smoking status: Never    Smokeless tobacco: Never   Vaping Use    Vaping status: Never Used   Substance and Sexual Activity    Alcohol use: No     Alcohol/week: 0.0 standard drinks of alcohol    Drug use: No    Sexual activity: Yes   Other Topics Concern    Caffeine Concern Yes     Comment: tea/coffee - 2 cups      Family History   Problem Relation Age of Onset    Diabetes Father     Dementia Father         Parkinson's.     Lipids Father     Diabetes Mother     Heart Disease Mother 55    Lipids Mother     Diabetes Sister     Diabetes Brother     Glaucoma Neg     Macular degeneration Neg       No Known Allergies     REVIEW OF SYSTEMS:   Review of Systems   Review of Systems   Constitutional: Negative for activity change, appetite change and fever.   HENT: Negative for congestion and voice change.    Respiratory: Negative for cough and shortness of breath.    Cardiovascular: Negative for chest pain.   Gastrointestinal: Negative for abdominal distention, abdominal pain and vomiting.   Genitourinary: Negative for hematuria.   Skin: Negative for wound.   Psychiatric/Behavioral: Negative for behavioral problems.   Wt Readings from Last 5 Encounters:   08/05/24 166 lb (75.3 kg)   03/27/24 166 lb 12.8 oz (75.7 kg)   02/05/24 169 lb (76.7 kg)   10/09/23 170 lb (77.1 kg)   06/06/23 162 lb (73.5 kg)     Body mass index is 25.24 kg/m².      EXAM:   /80   Pulse 80   Ht 5' 8\" (1.727 m)   Wt 166 lb (75.3 kg)   SpO2 97%   BMI 25.24 kg/m²   Physical Exam   Constitutional:       Appearance: Normal appearance.   HENT:      Head: Normocephalic.   Eyes:      Conjunctiva/sclera: Conjunctivae normal.   Cardiovascular:      Rate and Rhythm: Normal rate and regular rhythm.      Heart sounds: Normal heart sounds. No murmur heard.  Pulmonary:      Effort: Pulmonary effort is normal.      Breath sounds:  Normal breath sounds. No rhonchi or rales.   Abdominal:      General: Bowel sounds are normal.      Palpations: Abdomen is soft.      Tenderness: There is no abdominal tenderness.   Musculoskeletal:      Cervical back: Neck supple.      Right lower leg: No edema.      Left lower leg: No edema.   Skin:     General: Skin is warm and dry.   Neurological:      General: No focal deficit present.      Mental Status: He is alert and oriented to person, place, and time. Mental status is at baseline.   Psychiatric:         Mood and Affect: Mood normal.         Behavior: Behavior normal.       ASSESSMENT AND PLAN:   1. Diabetes mellitus type 2 without retinopathy (HCC)  Lab Results   Component Value Date    A1C 7.3 (H) 02/03/2024    LDL 71 02/03/2024    MICROALBCREA 11.4 02/03/2024        - CBC, Platelet; No Differential; Future  - Comp Metabolic Panel (14); Future  - Hemoglobin A1C; Future  - Lipid Panel; Future  - TSH W Reflex To Free T4; Future  - Microalb/Creat Ratio, Random Urine; Future  Up-to-date on eye exam and foot exam.  Continue statins.  2. Essential hypertension  Lab Results   Component Value Date    CREATSERUM 0.87 02/03/2024    TSH 4.497 02/03/2024     Will continue to monitor blood pressure.  Encouraged patient to avoid salt.  Continue current medical regimen.    - CBC, Platelet; No Differential; Future  - Comp Metabolic Panel (14); Future  - Hemoglobin A1C; Future  - Lipid Panel; Future  - TSH W Reflex To Free T4; Future  - Microalb/Creat Ratio, Random Urine; Future    3. Hyperlipidemia, unspecified hyperlipidemia type  Lab Results   Component Value Date    LDL 71 02/03/2024    AST 24 02/03/2024    ALT 25 02/03/2024      Encouraged patient to eat healthy.  Avoid fat fried foods and increase activity as tolerated.  We will monitor lipid profile and liver function test.    - Microalb/Creat Ratio, Random Urine; Future    4. Coronary artery disease involving native coronary artery of native heart without angina  pectoris  Status post PCI-stable.  Continue aspirin and statins.    5. Screen for colon cancer  He will complete the colonoscopy.  - GASTRO - INTERNAL    Plan: As above.  I will see him back in 6 months.  He will contact me if there is any problems in between.      The patient indicates understanding of these issues and agrees to the plan.  No follow-ups on file.    This note was prepared using Dragon [employer] voice recognition dictation software. As a result errors may occur. When identified these errors have been corrected. While every attempt is made to correct errors during dictation discrepancies may still exist.

## 2024-09-05 RX ORDER — AMMONIUM LACTATE 12 G/100G
CREAM TOPICAL
Qty: 385 G | Refills: 1 | Status: SHIPPED | OUTPATIENT
Start: 2024-09-05

## 2024-09-05 NOTE — TELEPHONE ENCOUNTER
Please Review. Protocol Failed; No Protocol     Requested Prescriptions   Pending Prescriptions Disp Refills    AMMONIUM LACTATE 12 % External Cream [Pharmacy Med Name: AMMONIUM LACTATE 12% CRM] 385 g 1     Sig: APPLY DAILY DAILY DAILY DAILY 2 TIMES TO AFFECTED AREA       There is no refill protocol information for this order            Future Appointments         Provider Department Appt Notes    In 3 months VIOLA ORNELAS [facility] CLN w/MAC @ [facility]    In 4 months Barak Cardozo MD [facility]           Recent Outpatient Visits              1 month ago Diabetes mellitus type 2 without retinopathy (HCC)    [facility]    Office Visit    5 months ago Diabetes mellitus type 2 without retinopathy (HCC)    [facility] ([place] [place] [place] Barak Cardozo MD    Office Visit    7 months ago Diabetes mellitus type 2 without retinopathy (HCC)    [facility]    Office Visit    11 months ago Diabetes mellitus type 2 without retinopathy [facility]CC[facility]    [facility]    1 year ago Essential hypertension    [facility], Barak Perry MD    Office Visit

## 2024-10-09 ENCOUNTER — MED REC SCAN ONLY (OUTPATIENT)
Dept: INTERNAL MEDICINE CLINIC | Facility: CLINIC | Age: 69
End: 2024-10-09

## 2024-10-23 RX ORDER — AMMONIUM LACTATE 12 G/100G
CREAM TOPICAL
Qty: 385 G | Refills: 1 | Status: SHIPPED | OUTPATIENT
Start: 2024-10-23

## 2024-10-23 NOTE — TELEPHONE ENCOUNTER
Please review. Protocol Failed; No Protocol    Requested Prescriptions   Pending Prescriptions Disp Refills    AMMONIUM LACTATE 12 % External Cream [Pharmacy Med Name: AMMONIUM LACTATE 12% CRM] 385 g 1     Sig: APPLY DAILY DAILY DAILY DAILY 2 TIMES TO AFFECTED AREA       There is no refill protocol information for this order            Future Appointments         Provider Department Appt Notes    In 1 month VIOLA ORNELAS [facility] CLN w/MAC @ [facility]    In 2 months Barak Cardozo MD [facility]           Recent Outpatient Visits              2 months ago Diabetes mellitus type 2 without retinopathy (HCC)    [facility] [place] Barak Cardozo MD    Office Visit    7 months ago Diabetes mellitus type 2 without retinopathy (HCC)    [facility] [place] Barak Cardozo MD    Office Visit    8 months ago Diabetes mellitus type 2 without retinopathy (HCC)    [facility] [place] Barak Cardozo MD    Office Visit    1 year ago Diabetes mellitus type 2 without retinopathy (HCC)    [facility] [place]Barak Odell MD    Office Visit    1 year ago Essential hypertension    [facility], [facility], Barak ePrry MD    Office Visit

## 2024-12-06 ENCOUNTER — TELEPHONE (OUTPATIENT)
Dept: INTERNAL MEDICINE CLINIC | Facility: CLINIC | Age: 69
End: 2024-12-06

## 2024-12-11 NOTE — TELEPHONE ENCOUNTER
Please see patient dispense history   Medication dispensed on 10/12/2024 for 3 month supply  Please see patient current medication list     Medication List  As of 12/6/2024 11:59 PM  Ammonium Lactate 12 % APPLY DAILY DAILY DAILY DAILY 2 TIMES TO AFFECTED AREA    Aspirin 81 mg Oral Daily    Cholecalciferol 25 MCG (1000 UT) Oral    Clotrimazole-Betamethasone 1-0.05 % 45 g Topical 2 times daily PRN    Lisinopril 5 mg Oral Daily    metFORMIN HCl 500 MG TAKE ONE TABLET BY MOUTH IN THE MORNING AND TAKE TWO TABLETS BY MOUTH IN THE EVENING    Simvastatin 20 mg Oral Nightly  MyChart sent to patient clarify if patient is still taking

## 2024-12-12 RX ORDER — METOPROLOL SUCCINATE 25 MG/1
25 TABLET, EXTENDED RELEASE ORAL DAILY
Qty: 90 TABLET | Refills: 3 | OUTPATIENT
Start: 2024-12-12

## 2024-12-12 NOTE — TELEPHONE ENCOUNTER
Dear nursing,    Please call patient to verify if patient if they are still taking METOPROLOL SUCC 25 mg. We received a refill request from patient's [facility]. Patient last refilled metoprolol on 10/12/24 for a 90 day supply.    Medication was not discontinued correctly. No reason was chosen.    Once verified with patient, if patient is not taking metoprolol please discontinue ALL metoprolol succ ER 25 utilizing the **Stop this medication reason. For the discontinued date, please put 03/27/2024.      Per our records at office visit with Dr. Barak Cardozo noted below:    Per office visit with Dr. Barak Cardozo 3/27/24;  He report that he is doing well. He stopped metoprolol because of low blood pressure. Today we rechecked the blood pressure it is running good.     Essential hypertension  Blood pressure is optimally controlled.  I have rechecked the blood pressure.  It was 126/76.  Continue lisinopril.  He is off metoprolol.    Requested Prescriptions     Pending Prescriptions Disp Refills    METOPROLOL SUCCINATE ER 25 MG Oral Tablet 24 Hr [Pharmacy Med Name: METOPROLOL SUCCINATE 25MG TAB] 90 tablet 3     Sig: TAKE 1 TABLET (25 MG TOTAL) BY MOUTH DAILY.

## 2024-12-12 NOTE — TELEPHONE ENCOUNTER
RN spoke with a daughter Zeus  on verbal release . Patient's date of birth and full name both confirmed.   She clarifies that patient has stopped taking metoprolol, and has not restarted metoprolol . They did not request this refill from [facility].     Upon chart review --- all metoprolol succinate orders were discontinued, and are not active.

## 2024-12-13 ENCOUNTER — TELEPHONE (OUTPATIENT)
Facility: CLINIC | Age: 69
End: 2024-12-13

## 2024-12-13 NOTE — TELEPHONE ENCOUNTER
Patient states he needs his prep instructions, he never go them.  He said he was told that someone would call him 24 hours prior to the procedure to review the instructions.  I reviewed prep instructions at length with patient using Eloina  Guerda ID # [id number]  Patient voiced understanding.  I sent prep instructions to his MyChart as well.

## 2024-12-16 ENCOUNTER — HOSPITAL ENCOUNTER (OUTPATIENT)
Facility: HOSPITAL | Age: 69
Setting detail: HOSPITAL OUTPATIENT SURGERY
Discharge: HOME | End: 2024-12-16
Attending: INTERNAL MEDICINE | Admitting: INTERNAL MEDICINE
Payer: MEDICARE

## 2024-12-16 ENCOUNTER — ANESTHESIA (OUTPATIENT)
Dept: ENDOSCOPY | Facility: HOSPITAL | Age: 69
End: 2024-12-16
Payer: MEDICARE

## 2024-12-16 ENCOUNTER — ANESTHESIA EVENT (OUTPATIENT)
Dept: ENDOSCOPY | Facility: HOSPITAL | Age: 69
End: 2024-12-16
Payer: MEDICARE

## 2024-12-16 VITALS
HEART RATE: 66 BPM | RESPIRATION RATE: 14 BRPM | SYSTOLIC BLOOD PRESSURE: 120 MMHG | OXYGEN SATURATION: 98 % | WEIGHT: 165 LBS | HEIGHT: 68 IN | DIASTOLIC BLOOD PRESSURE: 82 MMHG | BODY MASS INDEX: 25.01 KG/M2

## 2024-12-16 DIAGNOSIS — Z12.11 COLON CANCER SCREENING: ICD-10-CM

## 2024-12-16 LAB — GLUCOSE BLDC GLUCOMTR-MCNC: 129 MG/DL (ref 70–99)

## 2024-12-16 PROCEDURE — 45380 COLONOSCOPY AND BIOPSY: CPT | Performed by: INTERNAL MEDICINE

## 2024-12-16 PROCEDURE — 0DBP8ZZ EXCISION OF RECTUM, VIA NATURAL OR ARTIFICIAL OPENING ENDOSCOPIC: ICD-10-PCS | Performed by: INTERNAL MEDICINE

## 2024-12-16 PROCEDURE — 0DBM8ZZ EXCISION OF DESCENDING COLON, VIA NATURAL OR ARTIFICIAL OPENING ENDOSCOPIC: ICD-10-PCS | Performed by: INTERNAL MEDICINE

## 2024-12-16 PROCEDURE — 45385 COLONOSCOPY W/LESION REMOVAL: CPT | Performed by: INTERNAL MEDICINE

## 2024-12-16 PROCEDURE — 0DBL8ZZ EXCISION OF TRANSVERSE COLON, VIA NATURAL OR ARTIFICIAL OPENING ENDOSCOPIC: ICD-10-PCS | Performed by: INTERNAL MEDICINE

## 2024-12-16 RX ORDER — SODIUM CHLORIDE, SODIUM LACTATE, POTASSIUM CHLORIDE, CALCIUM CHLORIDE 600; 310; 30; 20 MG/100ML; MG/100ML; MG/100ML; MG/100ML
INJECTION, SOLUTION INTRAVENOUS CONTINUOUS
Status: DISCONTINUED | OUTPATIENT
Start: 2024-12-16 | End: 2024-12-16

## 2024-12-16 RX ORDER — LIDOCAINE HYDROCHLORIDE 10 MG/ML
INJECTION, SOLUTION EPIDURAL; INFILTRATION; INTRACAUDAL; PERINEURAL AS NEEDED
Status: DISCONTINUED | OUTPATIENT
Start: 2024-12-16 | End: 2024-12-16 | Stop reason: SURG

## 2024-12-16 RX ORDER — NALOXONE HYDROCHLORIDE 0.4 MG/ML
0.08 INJECTION, SOLUTION INTRAMUSCULAR; INTRAVENOUS; SUBCUTANEOUS ONCE AS NEEDED
Status: DISCONTINUED | OUTPATIENT
Start: 2024-12-16 | End: 2024-12-16

## 2024-12-16 RX ADMIN — LIDOCAINE HYDROCHLORIDE 50 MG: 10 INJECTION, SOLUTION EPIDURAL; INFILTRATION; INTRACAUDAL; PERINEURAL at 09:25:00

## 2024-12-16 NOTE — ANESTHESIA PREPROCEDURE EVALUATION
Anesthesia PreOp Note    HPI:     Randall Potts is a 69 year old male who presents for preoperative consultation requested by: Michael Owen MD    Date of Surgery: 2024    Procedure(s):  COLONOSCOPY  Indication: Colon cancer screening    Relevant Problems   No relevant active problems       NPO:  Last Liquid Consumption Date: 24  Last Liquid Consumption Time: 0500  Last Solid Consumption Date: 12/15/24  Last Solid Consumption Time: 0900  Last Liquid Consumption Date: 24          History Review:  Patient Active Problem List    Diagnosis Date Noted    Abnormal cardiovascular stress test 10/17/2022    Asteroid hyalosis of left eye 2022    Coronary artery disease involving native coronary artery of native heart without angina pectoris 2022    Dystrophic nail 2020    Eczema 10/10/2017    Lesion of tongue chronic 2016    Diabetes mellitus type 2 without retinopathy (HCC) 2015    Age-related nuclear cataract of both eyes 2015    Essential hypertension 2013    Dizziness 2012    Chronic ischemic heart disease 2012    Vitamin D deficiency 2012    Hyperlipidemia 2008    Retention of urine 2007     urinary tract infection 2007    Esophageal reflux 2007    Benign prostatic hyperplasia without lower urinary tract symptoms 2006    Incomplete bladder emptying 2006    Chronic prostatitis 10/26/2005    Uncomplicated varicose veins 2005       Past Medical History:    Age-related nuclear cataract of both eyes    Anemia    Asteroid hyalosis of left eye    CAD (coronary artery disease)    x2.     Coronary atherosclerosis    STENT    Diabetes mellitus (HCC)    Diabetes mellitus type 2 without retinopathy (HCC)    Esophageal reflux    Essential hypertension with goal blood pressure less than 140/90    Fungal infection    Toingue. bx.     Heart attack (HCC)    YEARS AGO    High blood pressure    High  cholesterol    Other and unspecified hyperlipidemia    Type II or unspecified type diabetes mellitus without mention of complication, not stated as uncontrolled    Pills only    Unspecified essential hypertension    Urethral stricture    Varicose veins    stripping and ligation of varicosites       Past Surgical History:   Procedure Laterality Date    Angioplasty (coronary)      Colonoscopy  2009    Cystoscopy,dil urethral stricture  2000    Other surgical history Right     Varicose vein removal.     Upper gi endoscopy,exam  2009       Prescriptions Prior to Admission[1]  Current Medications and Prescriptions Ordered in Epic[2]    Allergies[3]    Family History   Problem Relation Age of Onset    Diabetes Father     Dementia Father         Parkinson's.     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   Tobacco Use    Smoking status: Never    Smokeless tobacco: Never   Vaping Use    Vaping status: Never Used   Substance and Sexual Activity    Alcohol use: No     Alcohol/week: 0.0 standard drinks of alcohol    Drug use: No    Sexual activity: Yes   Other Topics Concern    Caffeine Concern Yes     Comment: tea/coffee - 2 cups       Available pre-op labs reviewed.             Vital Signs:  Body mass index is 25.09 kg/m².   height is 1.727 m (5' 8\") and weight is 74.8 kg (165 lb). His blood pressure is 132/79 and his pulse is 75. His respiration is 16 and oxygen saturation is 97%.   Vitals:    12/10/24 1256 12/16/24 0907 12/16/24 0908   BP:   132/79   Pulse:  75    Resp:  16    SpO2:  97%    Weight: 74.8 kg (165 lb) 74.8 kg (165 lb)    Height: 1.727 m (5' 8\") 1.727 m (5' 8\")         Anesthesia Evaluation     Patient summary reviewed and Nursing notes reviewed    No history of  anesthetic complications   Airway   Mallampati: I  TM distance: >3 FB  Neck ROM: full  Dental - Dentition appears grossly intact     Pulmonary - normal exam   Cardiovascular - normal exam  Exercise tolerance: good  (+) hypertension well controlled, CAD, CABG/stent    Rhythm: regular  Rate: normal    Neuro/Psych - negative ROS     GI/Hepatic/Renal    (+) GERD well controlled    Endo/Other    (+) diabetes mellitus type 2 well controlled  Abdominal  - normal exam                 Anesthesia Plan:   ASA:  3  Plan:   MAC  Post-op Pain Management: IV analgesics  Informed Consent Plan and Risks Discussed With:  Patient  Use of Blood Products Discussed With:  Patient  Discussed plan with:  CRNA and surgeon      I have informed Randall Potts and/or legal guardian or family member of the nature of the anesthetic plan, benefits, risks including possible dental damage if relevant, major complications, and any alternative forms of anesthetic management.   All of the patient's questions were answered to the best of my ability. The patient desires the anesthetic management as planned.  Jenae Womack CRNA  12/16/2024 9:15 AM  Present on Admission:  **None**           [1]   Medications Prior to Admission   Medication Sig Dispense Refill Last Dose/Taking    Ammonium Lactate 12 % External Cream APPLY DAILY DAILY DAILY DAILY 2 TIMES TO AFFECTED AREA 385 g 1 Taking    simvastatin 20 MG Oral Tab Take 1 tablet (20 mg total) by mouth nightly. 90 tablet 3 12/15/2024    metFORMIN 500 MG Oral Tab TAKE ONE TABLET BY MOUTH IN THE MORNING AND TAKE TWO TABLETS BY MOUTH IN THE EVENING 270 tablet 3 12/14/2024    lisinopril 5 MG Oral Tab Take 1 tablet (5 mg total) by mouth daily. 90 tablet 3 12/15/2024    clotrimazole-betamethasone 1-0.05 % External Cream Apply 45 g topically 2 (two) times daily as needed. 60 g 3 Taking As Needed    aspirin 81 MG Oral Tab EC Take 1 tablet (81 mg total) by mouth daily.   12/14/2024    Vitamin D3 (VITAMIN D3) 1000  UNITS Oral Tab Take  by mouth.   12/13/2024   [2]   Current Facility-Administered Medications Ordered in Epic   Medication Dose Route Frequency Provider Last Rate Last Admin    lactated ringers infusion   Intravenous Continuous Michael Owen MD         No current [facility]-ordered outpatient medications on file.   [3] No Known Allergies

## 2024-12-16 NOTE — OPERATIVE REPORT
[facility] Endoscopy Report  Date of procedure-December 16, 2024     Preoperative Diagnosis:  -Colorectal screening        Postoperative Diagnosis:  -Colon polyps x 3  -Internal hemorrhoids        Procedure:    Colonoscopy      Surgeon:  Michael Owen M.D.     Anesthesia:  MAC      Technique:  After informed consent, the patient was placed in the left lateral recumbent position.  Digital rectal examination revealed no palpable intraluminal abnormalities.  An Olympus variable stiffness 190 series HD colonoscope was inserted into the rectum and advanced under direct vision by following the lumen to the cecum.  The colon was examined upon withdrawal in the left lateral position.     The procedure was well tolerated without immediate complication.        Findings:  The preparation of the colon was good.  The terminal ileum was examined for 4 cm and visually normal.  The ileocecal valve was well preserved. The visualized colonic mucosa from the cecum to the anal verge was normal with an intact vascular pattern.     Colon polyps x 3 removed as follows;  -Transverse x 1, sessile 4 mm in size and cold snare removed.  -Descending x 1, diminutive removed by cold forceps technique.  -Rectal x 1, diminutive removed by cold forceps technique.  All polypectomy sites inspected and found to be free of bleeding.  Specimens retrieved and sent for analysis.     Moderate internal hemorrhoids noted on retroflexed view     Estimated blood loss-insignificant  Specimens-see above     Impression:  -Colon polyps x 3  -Internal hemorrhoids     Recommendations:  - Post polypectomy instructions given  - Repeat colonoscopy in 3- 5 years  - Symptomatic treatment of hemorrhoids              Michael Owen MD  12/16/2024  10:01 AM

## 2024-12-16 NOTE — DISCHARGE INSTRUCTIONS
Home Care Instructions for Colonoscopy with Sedation    Diet:  - Resume your regular diet as tolerated unless otherwise instructed.  - Start with light meals to minimize bloating.  - Do not drink alcohol today.    Medication:  - If you have questions about resuming your normal medications, please contact your Primary Care Physician.    Activities:  - Take it easy today. Do not return to work today.  - Do not drive today.  - Do not operate any machinery today (including kitchen equipment).  -   Do not make any critical decisions or sign any paperwork.  - Do not exercise today.    Colonoscopy:  - You may notice some rectal \"spotting\" (a little blood on the toilet tissue) for a day or two after the exam. This is normal.  - If you experience any rectal bleeding (not spotting), persistent tenderness or sharp severe abdominal pains, oral temperature over 100 degrees Fahrenheit, light-headedness or dizziness, or any other problems, contact your doctor.      **If unable to reach your doctor, please go to the [facility] Emergency Room**    - Your referring physician will receive a full report of your examination.  - If you do not hear from your doctor's office within two weeks of your biopsy, please call them for your results.    You may be able to see your laboratory results in [facility] between 4 and 7 business days.  In some cases, your physician may not have viewed the results before they are released to [email or web address].  If you have questions regarding your results contact the physician who ordered the test/exam by phone or via [email or web address] by choosing \"Ask a Medical Question.\"

## 2024-12-16 NOTE — H&P
History & Physical Examination    Patient Name: Randall Potts  MRN: [id number]  CSN: [id number]  YOB: 1955    Diagnosis:   CRC Screening      Prescriptions Prior to Admission[1]  Current Facility-Administered Medications   Medication Dose Route Frequency    lactated ringers infusion   Intravenous Continuous       Allergies: Allergies[2]    Past Medical History:    Age-related nuclear cataract of both eyes    Anemia    Asteroid hyalosis of left eye    CAD (coronary artery disease)    x2.     Coronary atherosclerosis    STENT    Diabetes mellitus (HCC)    Diabetes mellitus type 2 without retinopathy (HCC)    Esophageal reflux    Essential hypertension with goal blood pressure less than 140/90    Fungal infection    Toingue. bx.     Heart attack (HCC)    YEARS AGO    High blood pressure    High cholesterol    Other and unspecified hyperlipidemia    Type II or unspecified type diabetes mellitus without mention of complication, not stated as uncontrolled    Pills only    Unspecified essential hypertension    Urethral stricture    Varicose veins    stripping and ligation of varicosites     Past Surgical History:   Procedure Laterality Date    Angioplasty (coronary)      Colonoscopy  2009    Cystoscopy,dil urethral stricture  2000    Other surgical history Right     Varicose vein removal.     Upper gi endoscopy,exam  2009     Family History   Problem Relation Age of Onset    Diabetes Father     Dementia Father         Parkinson's.     Lipids Father     Diabetes Mother     Heart Disease Mother 55    Lipids Mother     Diabetes Sister     Diabetes Brother     Glaucoma Neg     Macular degeneration Neg      Social History     Tobacco Use    Smoking status: Never    Smokeless tobacco: Never   Substance Use Topics    Alcohol use: No     Alcohol/week: 0.0 standard drinks of alcohol       SYSTEM Check if Review is Normal Check if Physical Exam is Normal If not normal, please explain:   HEENT [x ] [ x]    NECK &  BACK [x ] [x ]    HEART [x ] [ x]    LUNGS [x ] [ x]    ABDOMEN [x ] [x ]    UROGENITAL [ ] [ ]    EXTREMITIES [x ] [x ]    OTHER        [ x ] I have discussed the risks and benefits and alternatives with the patient/family.  They understand and agree to proceed with plan of care.  [ x ] I have reviewed the History and Physical done within the last 30 days.  Any changes noted above.    Michael Owen MD  12/16/2024  9:14 AM         [1]   Medications Prior to Admission   Medication Sig Dispense Refill Last Dose/Taking    Ammonium Lactate 12 % External Cream APPLY DAILY DAILY DAILY DAILY 2 TIMES TO AFFECTED AREA 385 g 1 Taking    simvastatin 20 MG Oral Tab Take 1 tablet (20 mg total) by mouth nightly. 90 tablet 3 12/15/2024    metFORMIN 500 MG Oral Tab TAKE ONE TABLET BY MOUTH IN THE MORNING AND TAKE TWO TABLETS BY MOUTH IN THE EVENING 270 tablet 3 12/14/2024    lisinopril 5 MG Oral Tab Take 1 tablet (5 mg total) by mouth daily. 90 tablet 3 12/15/2024    clotrimazole-betamethasone 1-0.05 % External Cream Apply 45 g topically 2 (two) times daily as needed. 60 g 3 Taking As Needed    aspirin 81 MG Oral Tab EC Take 1 tablet (81 mg total) by mouth daily.   12/14/2024    Vitamin D3 (VITAMIN D3) 1000 UNITS Oral Tab Take  by mouth.   12/13/2024   [2] No Known Allergies

## 2024-12-16 NOTE — ANESTHESIA POSTPROCEDURE EVALUATION
Patient: Randall Potts    Procedure Summary       Date: 12/16/24 Room / Location: [facility] ENDOSCOPY 03 / [facility] ENDOSCOPY    Anesthesia Start: 0923 Anesthesia Stop: 0959    Procedure: COLONOSCOPY Diagnosis:       Colon cancer screening      (colon polyps; hemorrhoids)    Surgeons: Michael Owen MD Anesthesiologist: Jenae Womack CRNA    Anesthesia Type: MAC ASA Status: 3            Anesthesia Type: MAC    Vitals Value Taken Time   BP 94/64 12/16/24 0955   Temp 97 12/16/24 0959   Pulse 76 12/16/24 0958   Resp 15 12/16/24 0958   SpO2 96 % 12/16/24 0958   Vitals shown include unfiled device data.    [facility] AN Post Evaluation:   Patient Evaluated in PACU  Patient Participation: complete - patient participated  Level of Consciousness: awake and alert  Pain Score: 0  Pain Management: adequate  Airway Patency:patent  Yes    Nausea/Vomiting: none  Cardiovascular Status: acceptable  Respiratory Status: acceptable  Postoperative Hydration acceptable      Jenae Womack CRNA  12/16/2024 9:59 AM

## 2024-12-17 ENCOUNTER — TELEPHONE (OUTPATIENT)
Facility: CLINIC | Age: 69
End: 2024-12-17

## 2024-12-17 NOTE — TELEPHONE ENCOUNTER
Seen by patient Randall Potts on 12/16/2024  8:26 PM       Health maintenance updated.  5 year colonoscopy recall placed in patient outreach.Next due on 12/16/2029 per Dr. Owen.

## 2024-12-17 NOTE — TELEPHONE ENCOUNTER
----- Message from Michael Owen sent at 12/16/2024  3:29 PM CST -----  I wanted to get back to you with your colonoscopy results.  You had 3 colon polyps removed which were benign.  I would advise a repeat colonoscopy in 5 years to make sure no new polyps are forming.      You also have internal hemorrhoids.  Please stay on a high fiber diet and call with any questions.

## 2024-12-26 RX ORDER — AMMONIUM LACTATE 12 G/100G
CREAM TOPICAL
Qty: 385 G | Refills: 1 | Status: SHIPPED | OUTPATIENT
Start: 2024-12-26

## 2025-01-02 ENCOUNTER — LAB ENCOUNTER (OUTPATIENT)
Dept: LAB | Age: 70
End: 2025-01-02
Attending: INTERNAL MEDICINE
Payer: MEDICARE

## 2025-01-02 DIAGNOSIS — E78.5 HYPERLIPIDEMIA, UNSPECIFIED HYPERLIPIDEMIA TYPE: ICD-10-CM

## 2025-01-02 DIAGNOSIS — I10 ESSENTIAL HYPERTENSION: ICD-10-CM

## 2025-01-02 DIAGNOSIS — E11.9 DIABETES MELLITUS TYPE 2 WITHOUT RETINOPATHY (HCC): ICD-10-CM

## 2025-01-02 LAB
ALBUMIN SERPL-MCNC: 4.4 G/DL (ref 3.2–4.8)
ALBUMIN/GLOB SERPL: 1.6 {RATIO} (ref 1–2)
ALP LIVER SERPL-CCNC: 60 U/L
ALT SERPL-CCNC: 24 U/L
ANION GAP SERPL CALC-SCNC: 6 MMOL/L (ref 0–18)
AST SERPL-CCNC: 23 U/L (ref ?–34)
BILIRUB SERPL-MCNC: 1.2 MG/DL (ref 0.2–1.1)
BUN BLD-MCNC: 8 MG/DL (ref 9–23)
BUN/CREAT SERPL: 8.9 (ref 10–20)
CALCIUM BLD-MCNC: 9.2 MG/DL (ref 8.7–10.4)
CHLORIDE SERPL-SCNC: 103 MMOL/L (ref 98–112)
CHOLEST SERPL-MCNC: 153 MG/DL (ref ?–200)
CO2 SERPL-SCNC: 29 MMOL/L (ref 21–32)
CREAT BLD-MCNC: 0.9 MG/DL
CREAT UR-SCNC: 42.2 MG/DL
DEPRECATED RDW RBC AUTO: 40.8 FL (ref 35.1–46.3)
EGFRCR SERPLBLD CKD-EPI 2021: 93 ML/MIN/1.73M2 (ref 60–?)
ERYTHROCYTE [DISTWIDTH] IN BLOOD BY AUTOMATED COUNT: 12.3 % (ref 11–15)
EST. AVERAGE GLUCOSE BLD GHB EST-MCNC: 171 MG/DL (ref 68–126)
FASTING PATIENT LIPID ANSWER: YES
FASTING STATUS PATIENT QL REPORTED: YES
GLOBULIN PLAS-MCNC: 2.8 G/DL (ref 2–3.5)
GLUCOSE BLD-MCNC: 142 MG/DL (ref 70–99)
HBA1C MFR BLD: 7.6 % (ref ?–5.7)
HCT VFR BLD AUTO: 40.4 %
HDLC SERPL-MCNC: 46 MG/DL (ref 40–59)
HGB BLD-MCNC: 13.3 G/DL
LDLC SERPL CALC-MCNC: 76 MG/DL (ref ?–100)
MCH RBC QN AUTO: 29.8 PG (ref 26–34)
MCHC RBC AUTO-ENTMCNC: 32.9 G/DL (ref 31–37)
MCV RBC AUTO: 90.4 FL
MICROALBUMIN UR-MCNC: 2.7 MG/DL
MICROALBUMIN/CREAT 24H UR-RTO: 64 UG/MG (ref ?–30)
NONHDLC SERPL-MCNC: 107 MG/DL (ref ?–130)
OSMOLALITY SERPL CALC.SUM OF ELEC: 287 MOSM/KG (ref 275–295)
PLATELET # BLD AUTO: 252 10(3)UL (ref 150–450)
POTASSIUM SERPL-SCNC: 4.1 MMOL/L (ref 3.5–5.1)
PROT SERPL-MCNC: 7.2 G/DL (ref 5.7–8.2)
RBC # BLD AUTO: 4.47 X10(6)UL
SODIUM SERPL-SCNC: 138 MMOL/L (ref 136–145)
TRIGL SERPL-MCNC: 186 MG/DL (ref 30–149)
TSI SER-ACNC: 4.07 UIU/ML (ref 0.55–4.78)
VLDLC SERPL CALC-MCNC: 29 MG/DL (ref 0–30)
WBC # BLD AUTO: 6.5 X10(3) UL (ref 4–11)

## 2025-01-02 PROCEDURE — 36415 COLL VENOUS BLD VENIPUNCTURE: CPT

## 2025-01-02 PROCEDURE — 80053 COMPREHEN METABOLIC PANEL: CPT

## 2025-01-02 PROCEDURE — 80061 LIPID PANEL: CPT

## 2025-01-02 PROCEDURE — 83036 HEMOGLOBIN GLYCOSYLATED A1C: CPT

## 2025-01-02 PROCEDURE — 82043 UR ALBUMIN QUANTITATIVE: CPT

## 2025-01-02 PROCEDURE — 85027 COMPLETE CBC AUTOMATED: CPT

## 2025-01-02 PROCEDURE — 84443 ASSAY THYROID STIM HORMONE: CPT

## 2025-01-02 PROCEDURE — 82570 ASSAY OF URINE CREATININE: CPT

## 2025-01-03 RX ORDER — SIMVASTATIN 20 MG
20 TABLET ORAL NIGHTLY
Qty: 90 TABLET | Refills: 3 | Status: CANCELLED | OUTPATIENT
Start: 2025-01-03

## 2025-01-03 NOTE — TELEPHONE ENCOUNTER
Current Outpatient Medications:   [place] TRUE METRIX TEST STRIPS (50)  [place] TRUE MATRIX AIR METER KIT W/DEVICE  ALCOHOL SWABS  AMMONIUM LAC 12% CREAM 140GM 12  LANCETS TRUEPLUS 28G      simvastatin 20 MG Oral Tab, Take 1 tablet (20 mg total) by mouth nightly., Disp: 90 tablet, Rfl: 3    metFORMIN 500 MG Oral Tab, TAKE ONE TABLET BY MOUTH IN THE MORNING AND TAKE TWO TABLETS BY MOUTH IN THE EVENING, Disp: 270 tablet, Rfl: 3    lisinopril 5 MG Oral Tab, Take 1 tablet (5 mg total) by mouth daily., Disp: 90 tablet, Rfl: 3

## 2025-01-06 ENCOUNTER — OFFICE VISIT (OUTPATIENT)
Dept: INTERNAL MEDICINE CLINIC | Facility: CLINIC | Age: 70
End: 2025-01-06
Payer: MEDICARE

## 2025-01-06 VITALS
SYSTOLIC BLOOD PRESSURE: 130 MMHG | DIASTOLIC BLOOD PRESSURE: 80 MMHG | HEART RATE: 75 BPM | OXYGEN SATURATION: 100 % | BODY MASS INDEX: 25.67 KG/M2 | WEIGHT: 169.38 LBS | HEIGHT: 68 IN

## 2025-01-06 DIAGNOSIS — N40.0 BENIGN PROSTATIC HYPERPLASIA WITHOUT LOWER URINARY TRACT SYMPTOMS: ICD-10-CM

## 2025-01-06 DIAGNOSIS — E78.5 HYPERLIPIDEMIA, UNSPECIFIED HYPERLIPIDEMIA TYPE: ICD-10-CM

## 2025-01-06 DIAGNOSIS — I10 ESSENTIAL HYPERTENSION: ICD-10-CM

## 2025-01-06 DIAGNOSIS — H25.13 AGE-RELATED NUCLEAR CATARACT OF BOTH EYES: ICD-10-CM

## 2025-01-06 DIAGNOSIS — K21.9 GASTROESOPHAGEAL REFLUX DISEASE WITHOUT ESOPHAGITIS: ICD-10-CM

## 2025-01-06 DIAGNOSIS — E11.9 DIABETES MELLITUS TYPE 2 WITHOUT RETINOPATHY (HCC): Primary | ICD-10-CM

## 2025-01-06 DIAGNOSIS — E11.29 DIABETES MELLITUS WITH MICROALBUMINURIA (HCC): ICD-10-CM

## 2025-01-06 DIAGNOSIS — N48.1 BALANITIS: ICD-10-CM

## 2025-01-06 DIAGNOSIS — I83.90 UNCOMPLICATED VARICOSE VEINS: ICD-10-CM

## 2025-01-06 DIAGNOSIS — I25.9 CHRONIC ISCHEMIC HEART DISEASE: ICD-10-CM

## 2025-01-06 DIAGNOSIS — E55.9 VITAMIN D DEFICIENCY: ICD-10-CM

## 2025-01-06 DIAGNOSIS — I25.10 CORONARY ARTERY DISEASE INVOLVING NATIVE CORONARY ARTERY OF NATIVE HEART WITHOUT ANGINA PECTORIS: ICD-10-CM

## 2025-01-06 DIAGNOSIS — B35.1 ONYCHOMYCOSIS: ICD-10-CM

## 2025-01-06 DIAGNOSIS — R80.9 DIABETES MELLITUS WITH MICROALBUMINURIA (HCC): ICD-10-CM

## 2025-01-06 PROBLEM — R94.39 ABNORMAL CARDIOVASCULAR STRESS TEST: Status: RESOLVED | Noted: 2022-10-17 | Resolved: 2025-01-06

## 2025-01-06 PROBLEM — H43.22 ASTEROID HYALOSIS OF LEFT EYE: Status: RESOLVED | Noted: 2022-07-09 | Resolved: 2025-01-06

## 2025-01-06 RX ORDER — SIMVASTATIN 20 MG
20 TABLET ORAL NIGHTLY
Qty: 90 TABLET | Refills: 3 | Status: SHIPPED | OUTPATIENT
Start: 2025-01-06

## 2025-01-06 NOTE — PROGRESS NOTES
Subjective:   Randall Potts is a 69 year old male who presents for a MA AHA (Medicare Advantage Annual Health Assessment) and scheduled follow up of multiple significant but stable problems and acute uncomplicated problem.   69-year-old gentleman here for Medicare advantage super visit.  He reports that he is doing well except having discoloration of the nails and also having balanitis.  No falls no abuse no depression reported.  Blood sugars went up.    History/Other:   Fall Risk Assessment:   He has been screened for Falls and is low risk.      Cognitive Assessment:   He had a completely normal cognitive assessment - see flowsheet entries     Functional Ability/Status:   Randall Potts has a completely normal functional assessment. See flowsheet for details.      Depression Screening (PHQ):  PHQ-2 SCORE: 0  , done 1/6/2025        <5 minutes spent screening and counseling for depression    Advanced Directives:   He does NOT have a Living Will. [Do you have a living will?: (Patient-Rptd) No]  He does NOT have a Power of  for Health Care. [Do you have a healthcare power of ?: (Patient-Rptd) No]  Discussed Advance Care Planning with patient (and family/surrogate if present). Standard forms made available to patient in After Visit Summary.      Patient Active Problem List   Diagnosis    Chronic ischemic heart disease    Essential hypertension    Hyperlipidemia    Benign prostatic hyperplasia without lower urinary tract symptoms    Uncomplicated varicose veins    Vitamin D deficiency    Diabetes mellitus with microalbuminuria (HCC)    Diabetes mellitus type 2 without retinopathy (HCC)    Age-related nuclear cataract of both eyes    Lesion of tongue chronic    Coronary artery disease involving native coronary artery of native heart without angina pectoris    Chronic prostatitis    Dizziness    Dystrophic nail    Esophageal reflux    Incomplete bladder emptying    Retention of urine    Eczema      Allergies:  He has No Known Allergies.    Current Medications:  Outpatient Medications Marked as Taking for the 1/6/25 encounter (Office Visit) with Barak Cardozo MD   Medication Sig    metFORMIN 500 MG Oral Tab Take 2 tabs in AM and 1 tab in PM    simvastatin 20 MG Oral Tab Take 1 tablet (20 mg total) by mouth nightly.    Ammonium Lactate 12 % External Cream APPLY DAILY DAILY DAILY DAILY DAILY 2 TIMES TO AFFECTED AREA    lisinopril 5 MG Oral Tab Take 1 tablet (5 mg total) by mouth daily.    clotrimazole-betamethasone 1-0.05 % External Cream Apply 45 g topically 2 (two) times daily as needed.    aspirin 81 MG Oral Tab EC Take 1 tablet (81 mg total) by mouth daily.    Vitamin D3 (VITAMIN D3) 1000 UNITS Oral Tab Take  by mouth.       Medical History:  He  has a past medical history of Age-related nuclear cataract of both eyes (04/23/2015), Anemia (09/27/2012), Asteroid hyalosis of left eye (07/09/2022), CAD (coronary artery disease) (2012), Coronary atherosclerosis, Diabetes mellitus (HCC) (09/26/2014), Diabetes mellitus type 2 without retinopathy (HCC) (04/23/2015), Esophageal reflux, Essential hypertension with goal blood pressure less than 140/90 (11/14/2016), Fungal infection, Heart attack (HCC), High blood pressure, High cholesterol, Other and unspecified hyperlipidemia, Type II or unspecified type diabetes mellitus without mention of complication, not stated as uncontrolled (Dx 2014), Unspecified essential hypertension, Urethral stricture, and Varicose veins (2007).  Surgical History:  He  has a past surgical history that includes angioplasty (coronary); cystoscopy,dil urethral stricture (2000); colonoscopy (2009); upper gi endoscopy,exam (2009); other surgical history (Right); and colonoscopy (N/A, 12/16/2024).   Family History:  His family history includes Dementia in his father; Diabetes in his brother, father, mother, and sister; Heart Disease (age of onset: 55) in his mother; Lipids in his father and  mother.  Social History:  He  reports that he has never smoked. He has never used smokeless tobacco. He reports that he does not drink alcohol and does not use drugs.    Tobacco:  He has never smoked tobacco.    CAGE Alcohol Screen:   CAGE screening score of 0 on 12/31/2024, showing low risk of alcohol abuse.      Patient Care Team:  Barak Cardozo MD as PCP - General (Internal Medicine)  Astrid Kong MD (Cardiac Electrophysiology)    Review of Systems   Constitutional:  Negative for activity change, appetite change and fever.   HENT:  Negative for congestion and voice change.    Respiratory:  Negative for cough and shortness of breath.    Cardiovascular:  Negative for chest pain.   Gastrointestinal:  Negative for abdominal distention, abdominal pain and vomiting.   Genitourinary:  Negative for hematuria.   Skin:  Negative for wound.   Psychiatric/Behavioral:  Negative for behavioral problems.          Objective:   Physical Exam  Constitutional:       Appearance: Normal appearance.   HENT:      Head: Normocephalic.   Eyes:      Conjunctiva/sclera: Conjunctivae normal.   Cardiovascular:      Rate and Rhythm: Normal rate and regular rhythm.      Heart sounds: Normal heart sounds. No murmur heard.  Pulmonary:      Effort: Pulmonary effort is normal.      Breath sounds: Normal breath sounds. No rhonchi or rales.   Abdominal:      General: Bowel sounds are normal.      Palpations: Abdomen is soft.      Tenderness: There is no abdominal tenderness.   Musculoskeletal:      Cervical back: Neck supple.      Right lower leg: No edema.      Left lower leg: No edema.   Skin:     General: Skin is warm and dry.   Neurological:      General: No focal deficit present.      Mental Status: He is alert and oriented to person, place, and time. Mental status is at baseline.   Psychiatric:         Mood and Affect: Mood normal.         Behavior: Behavior normal.     Bilateral barefoot skin diabetic exam is normal, visualized feet and  the appearance is abnormal with onychomycosis  Bilateral monofilament/sensation of both feet is normal.  Pulsation pedal pulse exam of both lower legs/feet is normal as well.       /80 (BP Location: Right arm, Patient Position: Sitting, Cuff Size: adult)   Pulse 75   Ht 5' 8\" (1.727 m)   Wt 169 lb 6.4 oz (76.8 kg)   SpO2 100%   BMI 25.76 kg/m²  Estimated body mass index is 25.76 kg/m² as calculated from the following:    Height as of this encounter: 5' 8\" (1.727 m).    Weight as of this encounter: 169 lb 6.4 oz (76.8 kg).    Medicare Hearing Assessment:   Hearing Screening    Screening Method: Finger Rub  Finger Rub Result: Pass               Assessment & Plan:   Randall Potts is a 69 year old male who presents for a Medicare Assessment.     1. Diabetes mellitus type 2 without retinopathy (HCC) (Primary) continue with ophthalmology surveillance.  Monitor A1c.  -     Ophthalmology Referral - External  2. Chronic ischemic heart disease stable well-compensated  Overview:  Last stress 2022  follows up with Dr Kong  3. Essential hypertension controlled  4. Hyperlipidemia, unspecified hyperlipidemia type continue statins   5. Uncomplicated varicose veins status post surgery doing okay.  6. Coronary artery disease involving native coronary artery of native heart without angina pectoris-stable with unremarkable cardiac review of systems  Overview:  S/p PCI in [place]   7. Vitamin D deficiency continue supplements  8. Gastroesophageal reflux disease without esophagitis stable  9. Benign prostatic hyperplasia without lower urinary tract symptoms-doing okay.  Overview:  Reportedly urethral stricture   10. Age-related nuclear cataract of both eyes stable  11. Diabetes mellitus with microalbuminuria (HCC)  Overview:  Continue Lisinopril  Other orders  -     metFORMIN HCl; Take 2 tabs in AM and 1 tab in PM  Dispense: 270 tablet; Refill: 3  -     Simvastatin; Take 1 tablet (20 mg total) by mouth nightly.  Dispense:  90 tablet; Refill: 3    Additional evaluation apart from Medicare advantage super visit    Balanitis-discussed cleaning with lukewarm water morning and before go to bed.  Can apply Lotrisone cream.  If no improvement, he will contact me.    Onychomycosis with thickened nail bed.  Referral given to podiatry.    The patient indicates understanding of these issues and agrees to the plan.        No follow-ups on file.     Barak Cardozo MD, 1/6/2025     Supplementary Documentation:   General Health:  In the past six months, have you lost more than 10 pounds without trying?: (Patient-Rptd) 2 - No  Has your appetite been poor?: (Patient-Rptd) No  Type of Diet: (Patient-Rptd) Balanced  How does the patient maintain a good energy level?: (Patient-Rptd) Appropriate Exercise  How would you describe your daily physical activity?: (Patient-Rptd) Moderate  How would you describe your current health state?: (Patient-Rptd) Good  How do you maintain positive mental well-being?: (Patient-Rptd) Visiting Family  On a scale of 0 to 10, with 0 being no pain and 10 being severe pain, what is your pain level?: (Patient-Rptd) 0 - (None)  In the past six months, have you experienced urine leakage?: (Patient-Rptd) 0-No  At any time do you feel concerned for the safety/well-being of yourself and/or your children, in your home or elsewhere?: (Patient-Rptd) No  Have you had any immunizations at another office such as Influenza, Hepatitis B, Tetanus, or Pneumococcal?: (Patient-Rptd) No    Health Maintenance   Topic Date Due    COVID-19 Vaccine (4 - 2024-25 season) 09/01/2024    Influenza Vaccine (1) 10/01/2024    Annual Well Visit  01/01/2025    Fall Risk Screening (Annual)  01/01/2025    Diabetes Care: Foot Exam (Annual)  01/01/2025    Diabetes Care Dilated Eye Exam  12/16/2025 (Originally 12/16/2024)    Diabetes Care A1C  07/02/2025    Diabetes Care: GFR  01/02/2026    PSA  02/03/2026    Colorectal Cancer Screening  12/16/2029    Annual  Depression Screening  Completed    Diabetes Care: Microalb/Creat Ratio (Annual)  Completed    Pneumococcal Vaccine: 65+ Years  Completed    Zoster Vaccines  Completed

## 2025-01-08 RX ORDER — GLUCOSAMINE HCL/CHONDROITIN SU 500-400 MG
1 CAPSULE ORAL DAILY
Qty: 100 EACH | Refills: 3 | Status: SHIPPED | OUTPATIENT
Start: 2025-01-08

## 2025-01-08 RX ORDER — GLUCOSAM/CHON-MSM1/C/MANG/BOSW 500-416.6
1 TABLET ORAL DAILY
Qty: 100 EACH | Refills: 3 | Status: SHIPPED | OUTPATIENT
Start: 2025-01-08

## 2025-01-08 RX ORDER — LISINOPRIL 5 MG/1
5 TABLET ORAL DAILY
Qty: 90 TABLET | Refills: 3 | Status: SHIPPED | OUTPATIENT
Start: 2025-01-08

## 2025-01-08 RX ORDER — CALCIUM CITRATE/VITAMIN D3 200MG-6.25
1 TABLET ORAL DAILY
Qty: 100 STRIP | Refills: 3 | Status: SHIPPED | OUTPATIENT
Start: 2025-01-08

## 2025-01-08 RX ORDER — DIPHENHYDRAMINE HCL 25 MG
1 TABLET ORAL DAILY
Qty: 1 KIT | Refills: 0 | Status: SHIPPED | OUTPATIENT
Start: 2025-01-08

## 2025-01-08 RX ORDER — AMMONIUM LACTATE 12 G/100G
CREAM TOPICAL
Qty: 385 G | Refills: 1 | Status: SHIPPED | OUTPATIENT
Start: 2025-01-08

## 2025-01-08 NOTE — TELEPHONE ENCOUNTER
Ammonium Lactate Cream: 385 grams with 1 refill sent to [facility] on 2024    Metformin 500m year supply sent to [facility] on 2025    Lisinopril 5m year supply sent to [facility] pharmacy on 2024

## 2025-01-08 NOTE — TELEPHONE ENCOUNTER
Refill passed per [facility] protocol.  Requested Prescriptions   Pending Prescriptions Disp Refills    Glucose Blood (TRUE METRIX BLOOD GLUCOSE TEST) In Vitro Strip  3     Si strip by In Vitro route. For blood glucose monitoring.       Diabetic Supplies Protocol Failed - 2025 11:40 AM        Failed - Medication is active on med list        Passed - In person appointment or virtual visit in the past 12 mos or appointment in next 3 mos     Recent Outpatient Visits              2 days ago Diabetes mellitus type 2 without retinopathy (HCC)    [facility]    [facility]    5 months ago Diabetes mellitus type 2 without retinopathy (HCC)    [facility] L[facility]    Office Visit    9 months ago Diabetes mellitus type 2 without retinopathy (HCC)    [facility]    [facility]    11 months ago Diabetes mellitus type 2 without retinopathy ([facility])    [facility] Sean Cano Joseph, MD    Office Visit    1 year ago Diabetes mellitus type 2 without retinopathy ([facility])    [facility]          Future Appointments         Provider Department Appt Notes    In 6 months Barak Cardozo MD [facility] 6M FU                      Blood Glucose Monitoring Suppl (TRUE METRIX AIR GLUCOSE METER) w/Device Does not apply Kit 1 kit 0     Si kit. For blood glucose monitoring.       Diabetic Supplies Protocol Failed - 2025 11:40 AM        Failed - Medication is active on med list        Passed - In person appointment or virtual visit in the past 12 mos or appointment in next 3 mos     Recent Outpatient Visits              2 days ago Diabetes mellitus type 2 without retinopathy  (HCC)    [facility] [place] Barak Cardozo MD    Office Visit    5 months ago Diabetes mellitus type 2 without retinopathy (HCC)    [facility]    [facility]    9 months ago Diabetes mellitus type 2 without retinopathy (HCC)    [facility]    [facility]    11 months ago Diabetes mellitus type 2 without retinopathy (HCC)    [facility] Willian CanomhBarak Odell MD    Office Visit    1 year ago Diabetes mellitus type 2 without retinopathy ([facility])    [facility]    [facility]          Future Appointments         Provider Department Appt Notes    In 6 months Barak Cardozo MD [facility], [facility] 6M FU                      Ammonium Lactate 12 % External Cream 385 g 0     Sig: APPLY DAILY DAILY DAILY DAILY DAILY 2 TIMES TO AFFECTED AREA       There is no refill protocol information for this order       lisinopril 5 MG Oral Tab 90 tablet 0     Sig: Take 1 tablet (5 mg total) by mouth daily.       Hypertension Medications Protocol Passed - 1/8/2025 11:40 AM        Passed - CMP or BMP in past 12 months        Passed - Last BP reading less than 140/90     BP Readings from Last 1 Encounters:   01/06/25 130/80               Passed - In person appointment or virtual visit in the past 12 mos or appointment in next 3 mos     Recent Outpatient Visits              2 days ago Diabetes mellitus type 2 without retinopathy (HCC)    [facility]    Office Visit    5 months ago Diabetes mellitus type 2 without retinopathy (HCC)    [facility]    Office Visit    9 months ago Diabetes mellitus type 2  without retinopathy (HCC)    [facility]    [facility] Visit    11 months ago Diabetes mellitus type 2 without retinopathy (HCC)    [facility]    Office Visit    1 year ago Diabetes mellitus type 2 without retinopathy (HCC)    [facility] R[facility] Joseph, MD    Office Visit          Future Appointments         Provider Department Appt Notes    In 6 months Barak Cardozo MD [facility], [facility] [place] 6M FU                    Passed - EGFRCR or GFRNAA > 50     GFR Evaluation  EGFRCR: 93 , resulted on 1/2/2025          Passed - Medication is active on med list          TRUEplus Lancets 28G Does not apply Misc  0       Diabetic Supplies Protocol Failed - 1/8/2025 11:40 AM        Failed - Medication is active on med list        Passed - In person appointment or virtual visit in the past 12 mos or appointment in next 3 mos     Recent Outpatient Visits              2 days ago Diabetes mellitus type 2 without retinopathy (HCC)    [facility]    Office Visit    5 months ago Diabetes mellitus type 2 without retinopathy (HCC)    [facility]    Office Visit    9 months ago Diabetes mellitus type 2 without retinopathy (HCC)    [facility]    Office Visit    11 months ago Diabetes mellitus type 2 without retinopathy (HCC)    [facility]    Office Visit    1 year ago Diabetes mellitus type 2 without retinopathy (HCC)    [facility]    [facility] Visit          Future Appointments         Provider Department Appt  Notes    In 6 months Barak Cardozo MD [facility] 6M FU                      Alcohol Swabs 70 % Does not apply Pads  3     Sig: Apply 1 Pad topically. Use to clean finger before using lancet       There is no refill protocol information for this order      Refused Prescriptions Disp Refills    metFORMIN 500 MG Oral Tab 270 tablet 3     Sig: TAKE ONE TABLET BY MOUTH IN THE MORNING AND TAKE TWO TABLETS BY MOUTH IN THE EVENING       Diabetes Medication Protocol Passed - 1/8/2025 11:40 AM        Passed - Last A1C < 7.5 and within past 6 months     Lab Results   Component Value Date    A1C 7.6 (H) 01/02/2025             Passed - In person appointment or virtual visit in the past 6 mos or appointment in next 3 mos     Recent Outpatient Visits              2 days ago Diabetes mellitus type 2 without retinopathy (HCC)    [facility]    Office Visit    5 months ago Diabetes mellitus type 2 without retinopathy (HCC)    [facility]    [facility]    9 months ago Diabetes mellitus type 2 without retinopathy (HCC)    [facility]    [facility]    11 months ago Diabetes mellitus type 2 without retinopathy (HCC)    [facility]    [facility] Visit    1 year ago Diabetes mellitus type 2 without retinopathy (HCC)    [facility] Joseph, MD    Office Visit          Future Appointments         Provider Department Appt Notes    In 6 months Barak Cardozo MD [facility] [place] 6M FU                    Passed - Microalbumin procedure in past 12 months or taking ACE/ARB        Passed - EGFRCR or GFRNAA > 50     GFR Evaluation  EGFRCR: 93 , resulted  on 1/2/2025          Passed - GFR in the past 12 months        Passed - Medication is active on med list           Future Appointments         Provider Department Appt Notes    In 6 months Barak Cardozo MD [facility] 6M FU          Recent Outpatient Visits              2 days ago Diabetes mellitus type 2 without retinopathy (HCC)    [facility] [place] Barak Cardozo MD    Office Visit    5 months ago Diabetes mellitus type 2 without retinopathy (HCC)    [facility]    Office Visit    9 months ago Diabetes mellitus type 2 without retinopathy (HCC)    [facility]    [facility] Visit    11 months ago Diabetes mellitus type 2 without retinopathy (HCC)    [facility]    [facility] Visit    1 year ago Diabetes mellitus type 2 without retinopathy (HCC)    [facility]    [facility]

## 2025-01-08 NOTE — TELEPHONE ENCOUNTER
Please review; protocol failed/No Protocol    Mail order pharmacy requesting new scripts on testing supplies    Cream was sent to [facility] requesting     Requested Prescriptions   Pending Prescriptions Disp Refills    Glucose Blood (TRUE METRIX BLOOD GLUCOSE TEST) In Vitro Strip  3     Si strip by In Vitro route. For blood glucose monitoring.       Diabetic Supplies Protocol Failed - 2025 11:41 AM        Failed - Medication is active on med list        Passed - In person appointment or virtual visit in the past 12 mos or appointment in next 3 mos     Recent Outpatient Visits              2 days ago Diabetes mellitus type 2 without retinopathy (HCC)    [facility]    Office Visit    5 months ago Diabetes mellitus type 2 without retinopathy (HCC)    [facility]    Office Visit    9 months ago Diabetes mellitus type 2 without retinopathy (HCC)    [facility] Cano Joseph, [facility]    Office Visit    11 months ago Diabetes mellitus type 2 without retinopathy (HCC)    [facility]    [facility] Visit    1 year ago Diabetes mellitus type 2 without retinopathy (HCC)    [facility] [place] [facility]    Office Visit          Future Appointments         Provider Department Appt Notes    In 6 months Barak Cardozo MD [facility] 6M FU                      Blood Glucose Monitoring Suppl (TRUE METRIX AIR GLUCOSE METER) w/Device Does not apply Kit 1 kit 0     Si kit. For blood glucose monitoring.       Diabetic Supplies Protocol Failed - 2025 11:41 AM        Failed - Medication is active on med list        Passed - In person appointment or virtual visit in the past 12  mos or appointment in next 3 mos     Recent Outpatient Visits              2 days ago Diabetes mellitus type 2 without retinopathy (HCC)    [facility]    [facility] Visit    5 months ago Diabetes mellitus type 2 without retinopathy (HCC)    [facility] [place]    Office Visit    9 months ago Diabetes mellitus type 2 without retinopathy (HCC)    [facility]    11 months ago Diabetes mellitus type 2 without retinopathy (HCC)    [facility] [place]    Office Visit    1 year ago Diabetes mellitus type 2 without retinopathy ([facility]    [facility]          Future Appointments         Provider Department Appt Notes    In 6 months Barak Cardozo MD [facility] 6M FU                      Ammonium Lactate 12 % External Cream 385 g 0     Sig: APPLY DAILY DAILY DAILY DAILY DAILY 2 TIMES TO AFFECTED AREA       There is no refill protocol information for this order       TRUEplus Lancets 28G Does not apply Misc  0       Diabetic Supplies Protocol Failed - 1/8/2025 11:41 AM        Failed - Medication is active on med list        Passed - In person appointment or virtual visit in the past 12 mos or appointment in next 3 mos     Recent Outpatient Visits              2 days ago Diabetes mellitus type 2 without retinopathy (HCC)    [facility]    [facility]    5 months ago Diabetes mellitus type 2 without retinopathy (HCC)    [facility] [place]    Office Visit    9 months ago Diabetes mellitus type 2 without retinopathy (HCC)     [facility]    Office Visit    11 months ago Diabetes mellitus type 2 without retinopathy (HCC)    [facility]    [facility] Visit    1 year ago Diabetes mellitus type 2 without retinopathy (HCC)    [facility] Sean Cano Joseph, MD    Office Visit          Future Appointments         Provider Department Appt Notes    In 6 months Barak Cardozo MD [facility], [facility] 6M FU                      Alcohol Swabs 70 % Does not apply Pads  3     Sig: Apply 1 Pad topically. Use to clean finger before using lancet       There is no refill protocol information for this order      Signed Prescriptions Disp Refills    lisinopril 5 MG Oral Tab 90 tablet 3     Sig: Take 1 tablet (5 mg total) by mouth daily.       Hypertension Medications Protocol Passed - 1/8/2025 11:41 AM        Passed - CMP or BMP in past 12 months        Passed - Last BP reading less than 140/90     BP Readings from Last 1 Encounters:   01/06/25 130/80               Passed - In person appointment or virtual visit in the past 12 mos or appointment in next 3 mos     Recent Outpatient Visits              2 days ago Diabetes mellitus type 2 without retinopathy (HCC)    [facility] Sean Cano Joseph, MD    Office Visit    5 months ago Diabetes mellitus type 2 without retinopathy (HCC)    [facility]    Office Visit    9 months ago Diabetes mellitus type 2 without retinopathy (HCC)    [facility]    Office Visit    11 months ago Diabetes mellitus type 2 without retinopathy (HCC)    [facility]    Office Visit    1 year ago  Diabetes mellitus type 2 without retinopathy (HCC)    [facility] [place] [facility] [facility]    Office Visit          Future Appointments         Provider Department Appt Notes    In 6 months Barak Cardozo MD [facility] [place] 6M FU                    Passed - EGFRCR or GFRNAA > 50     GFR Evaluation  EGFRCR: 93 , resulted on 1/2/2025          Passed - Medication is active on med list         Refused Prescriptions Disp Refills    metFORMIN 500 MG Oral Tab 270 tablet 3     Sig: TAKE ONE TABLET BY MOUTH IN THE MORNING AND TAKE TWO TABLETS BY MOUTH IN THE EVENING       Diabetes Medication Protocol Passed - 1/8/2025 11:41 AM        Passed - Last A1C < 7.5 and within past 6 months     Lab Results   Component Value Date    A1C 7.6 (H) 01/02/2025             Passed - In person appointment or virtual visit in the past 6 mos or appointment in next 3 mos     Recent Outpatient Visits              2 days ago Diabetes mellitus type 2 without retinopathy (HCC)    [facility]    Office Visit    5 months ago Diabetes mellitus type 2 without retinopathy (HCC)    [facility]    Office Visit    9 months ago Diabetes mellitus type 2 without retinopathy (HCC)    [facility]    [facility]    11 months ago Diabetes mellitus type 2 without retinopathy (HCC)    [facility] Sean Cano Joseph, MD    Office Visit    1 year ago Diabetes mellitus type 2 without retinopathy (HCC)    [facility]    [facility]          Future Appointments         Provider Department Appt Notes    In 6 months [facility] Cardozo MD [facility]                    Passed - Microalbumin procedure in past 12 months or taking ACE/ARB        Passed - EGFRCR or GFRNAA > 50     GFR Evaluation  EGFRCR: 93 , resulted on 1/2/2025          Passed - GFR in the past 12 months        Passed - Medication is active on med list           Future Appointments         Provider Department Appt Notes    In 6 months Barak Cardozo MD [facility]M FU          Recent Outpatient Visits              2 days ago Diabetes mellitus type 2 without retinopathy (HCC)    [facility]    [facility] Visit    5 months ago Diabetes mellitus type 2 without retinopathy (HCC)    [facility] Sean Cano Joseph, MD    Office Visit    9 months ago Diabetes mellitus type 2 without retinopathy (HCC)    [facility]    Office Visit    11 months ago Diabetes mellitus type 2 without retinopathy (HCC)    [facility]    Office Visit    1 year ago Diabetes mellitus type 2 without retinopathy (HCC)    [facility]    Office Visit

## 2025-01-09 PROBLEM — L30.9 ECZEMA: Status: RESOLVED | Noted: 2017-10-10 | Resolved: 2025-01-09

## 2025-02-24 ENCOUNTER — MED REC SCAN ONLY (OUTPATIENT)
Dept: INTERNAL MEDICINE CLINIC | Facility: CLINIC | Age: 70
End: 2025-02-24

## 2025-02-26 RX ORDER — AMMONIUM LACTATE 12 G/100G
CREAM TOPICAL
Qty: 385 G | Refills: 1 | Status: SHIPPED | OUTPATIENT
Start: 2025-02-26

## 2025-02-26 NOTE — TELEPHONE ENCOUNTER
Protocol Failed/ No Protocol    Requested Prescriptions   Pending Prescriptions Disp Refills    AMMONIUM LACTATE 12 % External Cream [Pharmacy Med Name: AMMONIUM LACTATE 12% CRM] 385 g 1     Sig: APPLY DAILY 2 TIMES TO AFFECTED AREA       There is no refill protocol information for this order          Future Appointments         Provider Department Appt Notes    In 4 months Barak Cardozo MD [facility] 6M FU          Recent Outpatient Visits              1 month ago Diabetes mellitus type 2 without retinopathy (HCC)    [facility] Barak Cardozo MD    Office Visit    6 months ago Diabetes mellitus type 2 without retinopathy (HCC)    [facility] Barak Cardozo MD    Office Visit    11 months ago Diabetes mellitus type 2 without retinopathy (HCC)    [facility]    Office Visit    1 year ago Diabetes mellitus type 2 without retinopathy (HCC)    [facility]    Office Visit    1 year ago Diabetes mellitus type 2 without retinopathy (HCC)    [facility] Barak Cardozo MD    Office Visit

## 2025-05-01 RX ORDER — AMMONIUM LACTATE 12 G/100G
CREAM TOPICAL
Qty: 385 G | Refills: 0 | OUTPATIENT
Start: 2025-05-01

## 2025-05-02 RX ORDER — AMMONIUM LACTATE 12 G/100G
CREAM TOPICAL
Qty: 385 G | Refills: 0 | OUTPATIENT
Start: 2025-05-02

## 2025-05-02 RX ORDER — PEN NEEDLE, DIABETIC 31 GX5/16"
NEEDLE, DISPOSABLE MISCELLANEOUS
Refills: 2 | OUTPATIENT
Start: 2025-05-02

## 2025-05-02 RX ORDER — GLUCOSAM/CHON-MSM1/C/MANG/BOSW 500-416.6
1 TABLET ORAL DAILY
Qty: 100 EACH | Refills: 3 | OUTPATIENT
Start: 2025-05-02

## 2025-05-02 RX ORDER — CALCIUM CITRATE/VITAMIN D3 200MG-6.25
1 TABLET ORAL DAILY
Refills: 2 | OUTPATIENT
Start: 2025-05-02

## 2025-05-02 NOTE — TELEPHONE ENCOUNTER
Alcohol swabs - 1 year supply sent 1/8/25 (has 2 refills remaining on prescription)    Ammonium lactate 12% cream - 2 bottles sent 2/26/25 (sent to [facility])    True Metrix test strips - 1 year supply sent 1/8/25 (has 2 refills remaining on prescription)    Metformin 500 mg - 1 year supply sent 1/6/25 (has 3 refills remaining on prescription)    TRUEplus Lancets - 1 year supply sent 1/8/25 (has 2 refills remaining on prescription)      [facility] filled:   - TRUEplus lancets: 4/7/2025 for 90 day supply (not due until 7/7/2025)   - Alcohol swabs: 3/8/2025 for a 90 day supply (not due until 6/7/2025)   - True Metrix test strips: 4/7/2025 for a 90 day supply (not due until 7/7/2025)   - Metformin: 3/1/2025 for a 90 day supply (not due until 5/31/2025)    **Ammonium Lactate cream: 2 bottles sent 2/26/25 to [facility] - no dispense history**

## 2025-06-20 ENCOUNTER — OFFICE VISIT (OUTPATIENT)
Dept: PODIATRY CLINIC | Facility: CLINIC | Age: 70
End: 2025-06-20
Payer: MEDICARE

## 2025-06-20 DIAGNOSIS — E11.9 DIABETES MELLITUS TYPE 2 WITHOUT RETINOPATHY (HCC): Primary | ICD-10-CM

## 2025-06-20 DIAGNOSIS — B35.3 TINEA PEDIS OF BOTH FEET: ICD-10-CM

## 2025-06-20 DIAGNOSIS — B35.1 ONYCHOMYCOSIS: ICD-10-CM

## 2025-06-20 PROCEDURE — 1159F MED LIST DOCD IN RCRD: CPT | Performed by: PODIATRIST

## 2025-06-20 PROCEDURE — 1126F AMNT PAIN NOTED NONE PRSNT: CPT | Performed by: PODIATRIST

## 2025-06-20 PROCEDURE — 99204 OFFICE O/P NEW MOD 45 MIN: CPT | Performed by: PODIATRIST

## 2025-06-20 RX ORDER — KETOCONAZOLE 20 MG/G
1 CREAM TOPICAL DAILY
Qty: 30 G | Refills: 0 | Status: SHIPPED | OUTPATIENT
Start: 2025-06-20

## 2025-06-20 NOTE — PROGRESS NOTES
Reason for Visit      Randall Potts is a 69 year old male presents today complaining of bilateral diabetic footcare.     History of Present Illness     Patient presents to clinic today after last seen podiatry in 2015 for diabetic foot evaluation, onychomycosis and tinea pedis.    The following portions of the patient's history were reviewed and updated as appropriate: allergies, current medications, past family history, past medical history, past social history, past surgical history and problem list.    Allergies[1]    Medications - Current[2]    There are no discontinued medications.    Problem List[3]    Past Medical History[4]    Past Surgical History[5]    Family History[6]    Social History     Occupational History    Occupation: [employer].   Tobacco Use    Smoking status: Never    Smokeless tobacco: Never   Vaping Use    Vaping status: Never Used   Substance and Sexual Activity    Alcohol use: No     Alcohol/week: 0.0 standard drinks of alcohol    Drug use: No    Sexual activity: Yes       ROS      Constitutional: negative for chills, fevers and sweats  Gastrointestinal: negative for abdominal pain, diarrhea, nausea and vomiting  Genitourinary:negative for dysuria and hematuria  Musculoskeletal:negative for arthralgias and muscle weakness  Neurological: negative for paresthesia and weakness  All others reviewed and negative.      Physical Exam     LE PHYSICAL EXAM    Constitution: Well-developed and well-nourished. Gait appears normal. No apparent distress. Alert and oriented to person, place, and time.  Integument: There are no varicosities. Skin appears moist, warm, and supple with positive hair growth. There are no color changes. No open lesions. No macerations, No Hyperkeratotic lesions.  Vascular examination: Dorsalis pedis and posterior tibial pulses are strong bilaterally with capillary filling time less than 3 seconds to all digits. There is no peripheral edema..  Neurological Sensorium: Grossly  intact to sharp/dull. Vibratory: Intact.  Musculoskeletal:   5/5 pedal muscle strength b/l     Patient was educated on the etiology and treatment options for onychomycosis. Both topical, oral, laser, and nail removal were explained in great detail.         Assessment and Plan     Encounter Diagnoses   Name Primary?    Diabetes mellitus type 2 without retinopathy (HCC) Yes    Onychomycosis     Tinea pedis of both feet    Diabetic education and instructions have been provided. We reviewed and discussed the following:    -risk categories related to pts with diabetes and foot or lower extremity complications per ADA.    -adherence to medication regimen and close monitoring or blood sugar control.   -daily monitoring/inspection of feet and shoes.   -proper management of diet and weight   -regular follow up with PCP and specialty providers as recommended   -Lower extremity complications related to DM were reviewed and stressed prevention.         Patient was educated on the etiology and treatment options for onychomycosis. Both topical, oral, laser, and nail removal were explained in great detail.   -Rx for Ketoconazole      Patient was instructed to call the office or on-call podiatric physician immediately with any issues or concerns before the next scheduled visit. Patient to follow-up in clinic in as needed      Jayshree Burciaga DPM, [email or web address] HOOKS  Diplomat, American Board of Foot and Ankle Surgery  Certified in Foot and Rearfoot/Ankle Reconstruction  Fellow of the American College of Foot and Ankle Surgeons  Fellowship Trained Foot and Ankle Surgeon   [facility]     6/20/2025    8:32 AM       [1] No Known Allergies  [2]   Current Outpatient Medications:     Ammonium Lactate 12 % External Cream, APPLY DAILY 2 TIMES TO AFFECTED AREA, Disp: 385 g, Rfl: 1    Glucose Blood (TRUE METRIX BLOOD GLUCOSE TEST) In Vitro Strip, 1 strip by In Vitro route daily. For blood glucose monitoring., Disp: 100 strip,  Rfl: 3    Blood Glucose Monitoring Suppl (TRUE METRIX AIR GLUCOSE METER) w/Device Does not apply Kit, 1 kit daily. For blood glucose monitoring., Disp: 1 kit, Rfl: 0    lisinopril 5 MG Oral Tab, Take 1 tablet (5 mg total) by mouth daily., Disp: 90 tablet, Rfl: 3    TRUEplus Lancets 28G Does not apply Misc, 1 Lancet by Finger stick route daily., Disp: 100 each, Rfl: 3    Alcohol Swabs 70 % Does not apply Pads, Apply 1 Pad topically daily. Use to clean finger before using lancet, Disp: 100 each, Rfl: 3    metFORMIN 500 MG Oral Tab, Take 2 tabs in AM and 1 tab in PM, Disp: 270 tablet, Rfl: 3    simvastatin 20 MG Oral Tab, Take 1 tablet (20 mg total) by mouth nightly., Disp: 90 tablet, Rfl: 3    clotrimazole-betamethasone 1-0.05 % External Cream, Apply 45 g topically 2 (two) times daily as needed., Disp: 60 g, Rfl: 3    aspirin 81 MG Oral Tab EC, Take 1 tablet (81 mg total) by mouth daily., Disp: , Rfl:     Vitamin D3 (VITAMIN D3) 1000 UNITS Oral Tab, Take  by mouth., Disp: , Rfl:   [3]   Patient Active Problem List  Diagnosis    Chronic ischemic heart disease    Essential hypertension    Hyperlipidemia    Benign prostatic hyperplasia without lower urinary tract symptoms    Uncomplicated varicose veins    Vitamin D deficiency    Diabetes mellitus with microalbuminuria (HCC)    Diabetes mellitus type 2 without retinopathy (HCC)    Age-related nuclear cataract of both eyes    Lesion of tongue chronic    Coronary artery disease involving native coronary artery of native heart without angina pectoris    Chronic prostatitis    Dystrophic nail    Esophageal reflux    Incomplete bladder emptying   [4]   Past Medical History:   Age-related nuclear cataract of both eyes    Anemia    Asteroid hyalosis of left eye    CAD (coronary artery disease)    x2.     Coronary atherosclerosis    STENT    Diabetes mellitus (HCC)    Diabetes mellitus type 2 without retinopathy (HCC)    Esophageal reflux    Essential hypertension with goal  blood pressure less than 140/90    Fungal infection    Toingue. bx.     Heart attack (HCC)    YEARS AGO    High blood pressure    High cholesterol    Other and unspecified hyperlipidemia    Type II or unspecified type diabetes mellitus without mention of complication, not stated as uncontrolled    Pills only    Unspecified essential hypertension    Urethral stricture    Varicose veins    stripping and ligation of varicosites   [5]   Past Surgical History:  Procedure Laterality Date    Angioplasty (coronary)      Colonoscopy  2009    Colonoscopy N/A 12/16/2024    Procedure: COLONOSCOPY;  Surgeon: Michael Owen MD;  Location: [facility] ENDOSCOPY    Cystoscopy,dil urethral stricture  2000    Other surgical history Right     Varicose vein removal.     Upper gi endoscopy,exam  2009   [6]   Family History  Problem Relation Age of Onset    Diabetes Father     Dementia Father         Parkinson's.     Lipids Father     Diabetes Mother     Heart Disease Mother 55    Lipids Mother     Diabetes Sister     Diabetes Brother     Glaucoma Neg     Macular degeneration Neg

## 2025-07-07 ENCOUNTER — OFFICE VISIT (OUTPATIENT)
Dept: INTERNAL MEDICINE CLINIC | Facility: CLINIC | Age: 70
End: 2025-07-07

## 2025-07-07 VITALS
TEMPERATURE: 97 F | HEIGHT: 68 IN | SYSTOLIC BLOOD PRESSURE: 126 MMHG | WEIGHT: 166 LBS | OXYGEN SATURATION: 97 % | BODY MASS INDEX: 25.16 KG/M2 | DIASTOLIC BLOOD PRESSURE: 68 MMHG | HEART RATE: 67 BPM

## 2025-07-07 DIAGNOSIS — I25.10 CORONARY ARTERY DISEASE INVOLVING NATIVE CORONARY ARTERY OF NATIVE HEART WITHOUT ANGINA PECTORIS: ICD-10-CM

## 2025-07-07 DIAGNOSIS — Z12.5 SCREENING PSA (PROSTATE SPECIFIC ANTIGEN): ICD-10-CM

## 2025-07-07 DIAGNOSIS — R80.9 DIABETES MELLITUS WITH MICROALBUMINURIA (HCC): Primary | ICD-10-CM

## 2025-07-07 DIAGNOSIS — I10 ESSENTIAL HYPERTENSION: ICD-10-CM

## 2025-07-07 DIAGNOSIS — E11.29 DIABETES MELLITUS WITH MICROALBUMINURIA (HCC): Primary | ICD-10-CM

## 2025-07-07 LAB — HEMOGLOBIN A1C: 6.8 % (ref 4.3–5.6)

## 2025-07-07 PROCEDURE — 1159F MED LIST DOCD IN RCRD: CPT | Performed by: INTERNAL MEDICINE

## 2025-07-07 PROCEDURE — 3008F BODY MASS INDEX DOCD: CPT | Performed by: INTERNAL MEDICINE

## 2025-07-07 PROCEDURE — 3074F SYST BP LT 130 MM HG: CPT | Performed by: INTERNAL MEDICINE

## 2025-07-07 PROCEDURE — 3078F DIAST BP <80 MM HG: CPT | Performed by: INTERNAL MEDICINE

## 2025-07-07 PROCEDURE — 3044F HG A1C LEVEL LT 7.0%: CPT | Performed by: INTERNAL MEDICINE

## 2025-07-07 PROCEDURE — G2211 COMPLEX E/M VISIT ADD ON: HCPCS | Performed by: INTERNAL MEDICINE

## 2025-07-07 PROCEDURE — 1126F AMNT PAIN NOTED NONE PRSNT: CPT | Performed by: INTERNAL MEDICINE

## 2025-07-07 PROCEDURE — 83036 HEMOGLOBIN GLYCOSYLATED A1C: CPT | Performed by: INTERNAL MEDICINE

## 2025-07-07 PROCEDURE — 99214 OFFICE O/P EST MOD 30 MIN: CPT | Performed by: INTERNAL MEDICINE

## 2025-07-07 NOTE — PROGRESS NOTES
Randall Potts is a 69 year old male.  Chief Complaint   Patient presents with    Follow - Up     6 month follow up     HPI:   69-year-old gentleman here for follow-up.  He reported he is doing well.  He denies any hypoglycemic events.  Denies any chest pain or shortness of breath.  Denies any abdominal pain nausea or vomiting.  He was seen by podiatry for onychomycosis.  It is better.  He is going to see ophthalmology soon.      Current Medications[1]   Past Medical History[2]   Past Surgical History[3]   Social History:  Short Social Hx on File[4]   Family History[5]   Allergies[6]     REVIEW OF SYSTEMS:   Review of Systems   Review of Systems   Constitutional: Negative for activity change, appetite change and fever.   HENT: Negative for congestion and voice change.    Respiratory: Negative for cough and shortness of breath.    Cardiovascular: Negative for chest pain.   Gastrointestinal: Negative for abdominal distention, abdominal pain and vomiting.   Genitourinary: Negative for hematuria.   Skin: Negative for wound.   Psychiatric/Behavioral: Negative for behavioral problems.   Wt Readings from Last 5 Encounters:   07/07/25 166 lb (75.3 kg)   01/06/25 169 lb 6.4 oz (76.8 kg)   12/16/24 165 lb (74.8 kg)   08/05/24 166 lb (75.3 kg)   03/27/24 166 lb 12.8 oz (75.7 kg)     Body mass index is 25.24 kg/m².      EXAM:   /68   Pulse 67   Temp 97.3 °F (36.3 °C) (Temporal)   Ht 5' 8\" (1.727 m)   Wt 166 lb (75.3 kg)   SpO2 97%   BMI 25.24 kg/m²   Physical Exam   Constitutional:       Appearance: Normal appearance.   HENT:      Head: Normocephalic.   Eyes:      Conjunctiva/sclera: Conjunctivae normal.   Cardiovascular:      Rate and Rhythm: Normal rate and regular rhythm.      Heart sounds: Normal heart sounds. No murmur heard.  Pulmonary:      Effort: Pulmonary effort is normal.      Breath sounds: Normal breath sounds. No rhonchi or rales.   Abdominal:      General: Bowel sounds are normal.       Palpations: Abdomen is soft.      Tenderness: There is no abdominal tenderness.   Musculoskeletal:      Cervical back: Neck supple.      Right lower leg: No edema.      Left lower leg: No edema.   Skin:     General: Skin is warm and dry.   Neurological:      General: No focal deficit present.      Mental Status: He is alert and oriented to person, place, and time. Mental status is at baseline.   Psychiatric:         Mood and Affect: Mood normal.         Behavior: Behavior normal.       ASSESSMENT AND PLAN:   1. Diabetes mellitus with microalbuminuria (HCC)  Currently he is taking metformin 1000 mg in the morning and 5 mg at night.  We will recheck hemoglobin A1c and adjust medicines as needed.  Continue statins.  He will complete ophthalmology evaluation.  - CBC, Platelet; No Differential; Future  - Comp Metabolic Panel (14); Future  - Hemoglobin A1C; Future  - Lipid Panel; Future  - TSH W Reflex To Free T4; Future  - Microalb/Creat Ratio, Random Urine; Future    2. Essential hypertension  Blood pressure is controlled.  Check kidney functions and thyroid function before next visit  - CBC, Platelet; No Differential; Future  - Comp Metabolic Panel (14); Future  - Hemoglobin A1C; Future  - Lipid Panel; Future  - TSH W Reflex To Free T4; Future  - Microalb/Creat Ratio, Random Urine; Future    3. Coronary artery disease involving native coronary artery of native heart without angina pectoris  Status post PCI-stable with unremarkable cardiac review of systems.  Continue aspirin and statins.  - CBC, Platelet; No Differential; Future  - Comp Metabolic Panel (14); Future  - Hemoglobin A1C; Future  - Lipid Panel; Future  - TSH W Reflex To Free T4; Future  - Microalb/Creat Ratio, Random Urine; Future    4. Screening PSA (prostate specific antigen)    - PSA Total, Screen; Future    Plan: Labs ordered.  Point-of-care hemoglobin A1c checked.  I will see him back in 6 months.  I will follow-up on the results.      The patient  indicates understanding of these issues and agrees to the plan.  No follow-ups on file.    This note was prepared using Dragon Medical voice recognition dictation software. As a result errors may occur. When identified these errors have been corrected. While every attempt is made to correct errors during dictation discrepancies may still exist.       [1]   Current Outpatient Medications   Medication Sig Dispense Refill    ketoconazole 2 % External Cream Apply 1 Application topically daily. 30 g 0    Ammonium Lactate 12 % External Cream APPLY DAILY 2 TIMES TO AFFECTED AREA 385 g 1    Glucose Blood (TRUE METRIX BLOOD GLUCOSE TEST) In Vitro Strip 1 strip by In Vitro route daily. For blood glucose monitoring. 100 strip 3    Blood Glucose Monitoring Suppl (TRUE METRIX AIR GLUCOSE METER) w/Device Does not apply Kit 1 kit daily. For blood glucose monitoring. 1 kit 0    lisinopril 5 MG Oral Tab Take 1 tablet (5 mg total) by mouth daily. 90 tablet 3    TRUEplus Lancets 28G Does not apply Misc 1 Lancet by Finger stick route daily. 100 each 3    Alcohol Swabs 70 % Does not apply Pads Apply 1 Pad topically daily. Use to clean finger before using lancet 100 each 3    metFORMIN 500 MG Oral Tab Take 2 tabs in AM and 1 tab in  tablet 3    simvastatin 20 MG Oral Tab Take 1 tablet (20 mg total) by mouth nightly. 90 tablet 3    clotrimazole-betamethasone 1-0.05 % External Cream Apply 45 g topically 2 (two) times daily as needed. 60 g 3    aspirin 81 MG Oral Tab EC Take 1 tablet (81 mg total) by mouth daily.      Vitamin D3 (VITAMIN D3) 1000 UNITS Oral Tab Take  by mouth.     [2]   Past Medical History:   Age-related nuclear cataract of both eyes    Anemia    Asteroid hyalosis of left eye    CAD (coronary artery disease)    x2.     Coronary atherosclerosis    STENT    Diabetes mellitus (HCC)    Diabetes mellitus type 2 without retinopathy (HCC)    Esophageal reflux    Essential hypertension with goal blood pressure less than  140/90    Fungal infection    Toingue. bx.     Heart attack (HCC)    YEARS AGO    High blood pressure    High cholesterol    Other and unspecified hyperlipidemia    Type II or unspecified type diabetes mellitus without mention of complication, not stated as uncontrolled    Pills only    Unspecified essential hypertension    Urethral stricture    Varicose veins    stripping and ligation of varicosites   [3]   Past Surgical History:  Procedure Laterality Date    Angioplasty (coronary)      Colonoscopy  2009    Colonoscopy N/A 12/16/2024    Procedure: COLONOSCOPY;  Surgeon: Michael Owen MD;  [facility] ENDOSCOPY    Cystoscopy,dil urethral stricture  2000    Other surgical history Right     Varicose vein removal.     Upper gi endoscopy,exam  2009   [4]   Social History  Socioeconomic History    Marital status:    Occupational History    Occupation: [employer].   Tobacco Use    Smoking status: Never    Smokeless tobacco: Never   Vaping Use    Vaping status: Never Used   Substance and Sexual Activity    Alcohol use: No     Alcohol/week: 0.0 standard drinks of alcohol    Drug use: No    Sexual activity: Yes   Other Topics Concern    Caffeine Concern Yes     Comment: tea/coffee - 2 cups     Social Drivers of Health     Food Insecurity: No Food Insecurity (7/7/2025)    NCSS - Food Insecurity     Worried About Running Out of Food in the Last Year: No     Ran Out of Food in the Last Year: No   Transportation Needs: No Transportation Needs (7/7/2025)    NCSS - Transportation     Lack of Transportation: No   Housing Stability: Not At Risk (7/7/2025)    NCSS - Housing/Utilities     Has Housing: Yes     Worried About Losing Housing: No     Unable to Get Utilities: No   [5]   Family History  Problem Relation Age of Onset    Diabetes Father     Dementia Father         Parkinson's.     Lipids Father     Diabetes Mother     Heart Disease Mother 55    Lipids Mother     Diabetes Sister     Diabetes Brother     Glaucoma Neg      Macular degeneration Neg    [6] No Known Allergies

## 2025-08-07 ENCOUNTER — TELEPHONE (OUTPATIENT)
Dept: CASE MANAGEMENT | Age: 70
End: 2025-08-07

## 2025-08-07 DIAGNOSIS — E11.9 DIABETES MELLITUS TYPE 2 WITHOUT RETINOPATHY (HCC): Primary | ICD-10-CM

## (undated) DEVICE — CO2 CANNULA,SSOFT,ADLT,7O2,4CO2,FEMALE: Brand: MEDLINE

## (undated) DEVICE — 60 ML SYRINGE REGULAR TIP: Brand: MONOJECT

## (undated) DEVICE — KIT CLEAN ENDOKIT 1.1OZ GOWNX2

## (undated) DEVICE — LASSO POLYPECTOMY SNARE: Brand: LASSO

## (undated) DEVICE — MEDI-VAC NON-CONDUCTIVE SUCTION TUBING 6MM X 1.8M (6FT.) L: Brand: CARDINAL HEALTH

## (undated) DEVICE — KIT ENDO ORCAPOD 160/180/190

## (undated) DEVICE — V2 SPECIMEN COLLECTION MANIFOLD KIT: Brand: NEPTUNE

## (undated) NOTE — ED AVS SNAPSHOT
[facility] in [place] [address].  [place] [address]    Phone:  [phone]    Fax:  [phone]           Mamie Obando   MRN: [id number]    Department:  [facility] in [address] [place] [address]   Date of Vis may not be covered by your plan. It is possible that the physician may not participate in your health insurance plan. This may result in a lower benefit level being available to you or other limited reimbursement.   The physician may seek payment directly If you have been prescribed any medication(s), please fill your prescription right away and begin taking the medication(s) as directed.   If you believe that any of the medications or instructions on this list is different from what your Primary Care doctor Patient [address] to help you get signed up for insurance coverage. [address] is a Federal Navigator program that can help with your Affordable Care Act coverage, as well as all types of Medicaid plans.   To get signed up and covere

## (undated) NOTE — MR AVS SNAPSHOT
[facility]  [address] [facility] [id number] [phone]               Thank you for choosing us for your health care visit with Bk Coleman.  Jac Greenberg MD.  We are glad to serve you and happy to provide you with this summary of yo Coronary artery disease due to lipid rich plaque Stable. Check stress test.     Tongue abnormality Try nystatin 1 tsp. 4 times a day for 10 days. Discussed with patient of side effects and use of these medications.        Orders Placed This Encounter  PNEUM Commonly known as:  PROTONIX           Polyethylene Glycol 3350 Pow[address]   TAKE 17 GRAMS BY ORAL ROUTE EVERY DAY AS DIRECTED AS NEEDED FOR CONSTIPATION   Commonly known as:  GLYCOLAX           simvastatin 10 MG Tabs   1 tab po qd   Commonly known as:  [facility], however your insurance company may require you to have these tests done at another facility OR obtain a prior authorization number prior to the exam. It is   the patient's responsibility to check with and follow their insurance company's Summaries. If you've been to the Emergency Department or your doctor's office, you can view your past visit information in [employer] by going to Visits < Visit Summaries. [employer] questions? Call [phone] for help.   [employer] is NOT to be used for urge

## (undated) NOTE — LETTER
[facility]  Administration of Anesthesia  I, Randall Potts agree to be cared for by a physician anesthesiologist alone and/or with a nurse anesthetist, who is specially trained to monitor me and give me medicine to put me to sleep or keep me comfortable during my procedure    I understand that my anesthesiologist and/or anesthetist is not an employee or agent of [facility] or [employer]. He or she works for [facility]    As the patient asking for anesthesia services, I agree to:  Allow the anesthesiologist (anesthesia doctor) to give me medicine and do additional procedures as necessary. Some examples are: Starting or using an “IV” to give me medicine, fluids or blood during my procedure, and having a breathing tube placed to help me breathe when I’m asleep (intubation). In the event that my heart stops working properly, I understand that my anesthesiologist will make every effort to sustain my life, unless otherwise directed by [facility] Do Not Resuscitate documents.  Tell my anesthesia doctor before my procedure:  If I am pregnant.  The last time that I ate or drank.  iii. All of the medicines I take (including prescriptions, herbal supplements, and pills I can buy without a prescription (including street drugs/illegal medications). Failure to inform my anesthesiologist about these medicines may increase my risk of anesthetic complications.  iv.If I am allergic to anything or have had a reaction to anesthesia before.  I understand how the anesthesia medicine will help me (benefits).  I understand that with any type of anesthesia medicine there are risks:  The most common risks are: nausea, vomiting, sore throat, muscle soreness, damage to my eyes, mouth, or teeth (from breathing tube placement).  Rare risks include: remembering what happened during my procedure, allergic reactions to medications, injury to my airway, heart, lungs, vision, nerves, or  muscles and in extremely rare instances death.  My doctor has explained to me other choices available to me for my care (alternatives).  Pregnant Patients (“epidural”):  I understand that the risks of having an epidural (medicine given into my back to help control pain during labor), include itching, low blood pressure, difficulty urinating, headache or slowing of the baby’s heart. Very rare risks include infection, bleeding, seizure, irregular heart rhythms and nerve injury.  Regional Anesthesia (“spinal”, “epidural”, & “nerve blocks”):  I understand that rare but potential complications include headache, bleeding, infection, seizure, irregular heart rhythms, and nerve injury.    _____________________________________________________________________________  Patient (or Representative) Signature/Relationship to Patient  Date   Time    _____________________________________________________________________________   Name (if used)    Language/Organization   Time    _____________________________________________________________________________  Nurse Anesthetist Signature     Date   Time  _____________________________________________________________________________  Anesthesiologist Signature     Date   Time  I have discussed the procedure and information above with the patient (or patient’s representative) and answered their questions. The patient or their representative has agreed to have anesthesia services.    _____________________________________________________________________________  Witness        Date   Time  I have verified that the signature is that of the patient or patient’s representative, and that it was signed before the procedure  Patient Name: Randall Engel Eamarleenor     : 1955                 Printed: 2024 at 11:16 AM    Medical Record #: [id number]                                            Page 1 of 1  ----------ANESTHESIA CONSENT----------

## (undated) NOTE — LETTER
11/4/2022              Radhika Rodriguez         Dear Sarah Franco,    This letter is to inform you that our office has made several attempts to reach you by phone without success. We were attempting to contact you by phone regarding scheduling your Colonoscopy. Please contact our office at the number listed below as soon as you receive this letter to discuss this issue and to make the necessary changes in our system to your contact information. Thank you for your cooperation. Sincerely,    Stiven Williamson, [address] [place] [address]  [phone]

## (undated) NOTE — LETTER
04/19/18        Naya Potts  [address]      Dear Luis Manuel Jansen,    [facility] records indicate that you have outstanding lab work and or testing that was ordered for you and has not yet been completed:          Hemoglobin A1C [E]      Nikhil

## (undated) NOTE — LETTER
12/19/17        Isac Potts  [address]      Dear Marie Goldman,    [address] [facility] records indicate that you have outstanding lab work and or testing that was ordered for you and has not yet been completed:          Hemoglobin A1C [E]      Nikhil

## (undated) NOTE — LETTER
6/13/2024    Randall Potts  [address] [place] [address]         Dear Randall,    This letter is to inform you that our office has made several attempts to reach you by phone without success.  We were attempting to contact you by phone regarding scheduling a Colonoscopy.    Please contact our office at the number listed below as soon as you receive this letter to discuss this issue and to make the necessary changes in our system to your contact information.  Thank you for your cooperation.        Sincerely,      Michael Owen MD  [address]  [facility] [id number]  [phone]

## (undated) NOTE — LETTER
[facility]  [address], [place], [place]    Authorization for Surgical Operation and Procedure                               I hereby authorize Michael Owen MD, my physician and his/her assistants (if applicable), which may include medical students, residents, and/or fellows, to perform the following surgical operation/ procedure and administer such anesthesia as may be determined necessary by my physician: Operation/Procedure name (s) COLONOSCOPY on Randall Engel Eacarmelo   2.   I recognize that during the surgical operation/procedure, unforeseen conditions may necessitate additional or different procedures than those listed above.  I, therefore, further authorize and request that the above-named surgeon, assistants, or designees perform such procedures as are, in their judgment, necessary and desirable.    3.   My surgeon/physician has discussed prior to my surgery the potential benefits, risks and side effects of this procedure; the likelihood of achieving goals; and potential problems that might occur during recuperation.  They also discussed reasonable alternatives to the procedure, including risks, benefits, and side effects related to the alternatives and risks related to not receiving this procedure.  I have had all my questions answered and I acknowledge that no guarantee has been made as to the result that may be obtained.    4.   Should the need arise during my operation/procedure, which includes change of level of care prior to discharge, I also consent to the administration of blood and/or blood products.  Further, I understand that despite careful testing and screening of blood or blood products by collecting agencies, I may still be subject to ill effects as a result of receiving a blood transfusion and/or blood products.  The following are some, but not all, of the potential risks that can occur: fever and allergic reactions, hemolytic reactions, transmission of diseases such  as Hepatitis, AIDS and Cytomegalovirus (CMV) and fluid overload.  In the event that I wish to have an autologous transfusion of my own blood, or a directed donor transfusion, I will discuss this with my physician.  Check only if Refusing Blood or Blood Products  I understand refusal of blood or blood products as deemed necessary by my physician may have serious consequences to my condition to include possible death. I hereby assume responsibility for my refusal and release the hospital, its personnel, and my physicians from any responsibility for the consequences of my refusal.    o  Refuse   5.   I authorize the use of any specimen, organs, tissues, body parts or foreign objects that may be removed from my body during the operation/procedure for diagnosis, research or teaching purposes and their subsequent disposal by hospital authorities.  I also authorize the release of specimen test results and/or written reports to my treating physician on the hospital medical staff or other referring or consulting physicians involved in my care, at the discretion of the Pathologist or my treating physician.    6.   I consent to the photographing or videotaping of the operations or procedures to be performed, including appropriate portions of my body for medical, scientific, or educational purposes, provided my identity is not revealed by the pictures or by descriptive texts accompanying them.  If the procedure has been photographed/videotaped, the surgeon will obtain the original picture, image, videotape or CD.  The hospital will not be responsible for storage, release or maintenance of the picture, image, tape or CD.    7.   I consent to the presence of a  or observers in the operating room as deemed necessary by my physician or their designees.    8.   I recognize that in the event my procedure results in extended X-Ray/fluoroscopy time, I may develop a skin reaction.    9. If I have a Do Not Attempt  Resuscitation (DNAR) order in place, that status will be suspended while in the operating room, procedural suite, and during the recovery period unless otherwise explicitly stated by me (or a person authorized to consent on my behalf). The surgeon or my attending physician will determine when the applicable recovery period ends for purposes of reinstating the DNAR order.  10. Patients having a sterilization procedure: I understand that if the procedure is successful the results will be permanent and it will therefore be impossible for me to inseminate, conceive, or bear children.  I also understand that the procedure is intended to result in sterility, although the result has not been guaranteed.   11. I acknowledge that my physician has explained sedation/analgesia administration to me including the risk and benefits I consent to the administration of sedation/analgesia as may be necessary or desirable in the judgment of my physician.    I CERTIFY THAT I HAVE READ AND FULLY UNDERSTAND THE ABOVE CONSENT TO OPERATION and/or OTHER PROCEDURE.     ____________________________________  _________________________________        ______________________________  Signature of Patient    Signature of Responsible Person                Printed Name of Responsible Person                                      ____________________________________  _____________________________                ________________________________  Signature of Witness        Date  Time         Relationship to Patient    STATEMENT OF PHYSICIAN My signature below affirms that prior to the time of the procedure; I have explained to the patient and/or his/her legal representative, the risks and benefits involved in the proposed treatment and any reasonable alternative to the proposed treatment. I have also explained the risks and benefits involved in refusal of the proposed treatment and alternatives to the proposed treatment and have answered the patient's  questions. If I have a significant financial interest in a co-management agreement or a significant financial interest in any product or implant, or other significant relationship used in this procedure/surgery, I have disclosed this and had a discussion with my patient.     _____________________________________________________              _____________________________  (Signature of Physician)                                                                                         (Date)                                   (Time)  Patient Name: Randall Engel Delroy      : 1955      Printed: 2024     Medical Record #: [id number]                                      Page 1 of 1